# Patient Record
Sex: FEMALE | Race: WHITE | NOT HISPANIC OR LATINO | Employment: FULL TIME | URBAN - METROPOLITAN AREA
[De-identification: names, ages, dates, MRNs, and addresses within clinical notes are randomized per-mention and may not be internally consistent; named-entity substitution may affect disease eponyms.]

---

## 2021-01-11 ENCOUNTER — RECORDS - HEALTHEAST (OUTPATIENT)
Dept: LAB | Facility: CLINIC | Age: 61
End: 2021-01-11

## 2021-01-11 LAB
SARS-COV-2 PCR COMMENT: NORMAL
SARS-COV-2 RNA SPEC QL NAA+PROBE: NEGATIVE
SARS-COV-2 VIRUS SPECIMEN SOURCE: NORMAL

## 2021-02-16 ENCOUNTER — RECORDS - HEALTHEAST (OUTPATIENT)
Dept: LAB | Facility: CLINIC | Age: 61
End: 2021-02-16

## 2021-03-09 ENCOUNTER — RECORDS - HEALTHEAST (OUTPATIENT)
Dept: LAB | Facility: CLINIC | Age: 61
End: 2021-03-09

## 2021-04-13 ENCOUNTER — RECORDS - HEALTHEAST (OUTPATIENT)
Dept: LAB | Facility: CLINIC | Age: 61
End: 2021-04-13

## 2021-04-20 ENCOUNTER — RECORDS - HEALTHEAST (OUTPATIENT)
Dept: LAB | Facility: CLINIC | Age: 61
End: 2021-04-20

## 2021-04-27 ENCOUNTER — RECORDS - HEALTHEAST (OUTPATIENT)
Dept: LAB | Facility: CLINIC | Age: 61
End: 2021-04-27

## 2021-05-18 ENCOUNTER — RECORDS - HEALTHEAST (OUTPATIENT)
Dept: LAB | Facility: CLINIC | Age: 61
End: 2021-05-18

## 2021-05-24 ENCOUNTER — RECORDS - HEALTHEAST (OUTPATIENT)
Dept: LAB | Facility: CLINIC | Age: 61
End: 2021-05-24

## 2021-05-30 ENCOUNTER — RECORDS - HEALTHEAST (OUTPATIENT)
Dept: ADMINISTRATIVE | Facility: CLINIC | Age: 61
End: 2021-05-30

## 2021-05-31 ENCOUNTER — RECORDS - HEALTHEAST (OUTPATIENT)
Dept: ADMINISTRATIVE | Facility: CLINIC | Age: 61
End: 2021-05-31

## 2021-06-01 ENCOUNTER — RECORDS - HEALTHEAST (OUTPATIENT)
Dept: ADMINISTRATIVE | Facility: CLINIC | Age: 61
End: 2021-06-01

## 2021-06-02 ENCOUNTER — RECORDS - HEALTHEAST (OUTPATIENT)
Dept: ADMINISTRATIVE | Facility: CLINIC | Age: 61
End: 2021-06-02

## 2021-06-03 ENCOUNTER — RECORDS - HEALTHEAST (OUTPATIENT)
Dept: LAB | Facility: CLINIC | Age: 61
End: 2021-06-03

## 2021-06-05 ENCOUNTER — RECORDS - HEALTHEAST (OUTPATIENT)
Dept: LAB | Facility: CLINIC | Age: 61
End: 2021-06-05

## 2021-06-10 ENCOUNTER — RECORDS - HEALTHEAST (OUTPATIENT)
Dept: LAB | Facility: CLINIC | Age: 61
End: 2021-06-10

## 2021-06-18 ENCOUNTER — RECORDS - HEALTHEAST (OUTPATIENT)
Dept: LAB | Facility: CLINIC | Age: 61
End: 2021-06-18

## 2021-07-21 ENCOUNTER — RECORDS - HEALTHEAST (OUTPATIENT)
Dept: ADMINISTRATIVE | Facility: CLINIC | Age: 61
End: 2021-07-21

## 2021-08-02 ENCOUNTER — LAB REQUISITION (OUTPATIENT)
Dept: LAB | Facility: CLINIC | Age: 61
End: 2021-08-02
Payer: COMMERCIAL

## 2021-08-02 DIAGNOSIS — U07.1 COVID-19: ICD-10-CM

## 2021-08-03 ENCOUNTER — LAB REQUISITION (OUTPATIENT)
Dept: LAB | Facility: CLINIC | Age: 61
End: 2021-08-03
Payer: COMMERCIAL

## 2021-08-03 DIAGNOSIS — U07.1 COVID-19: ICD-10-CM

## 2021-08-03 PROCEDURE — U0003 INFECTIOUS AGENT DETECTION BY NUCLEIC ACID (DNA OR RNA); SEVERE ACUTE RESPIRATORY SYNDROME CORONAVIRUS 2 (SARS-COV-2) (CORONAVIRUS DISEASE [COVID-19]), AMPLIFIED PROBE TECHNIQUE, MAKING USE OF HIGH THROUGHPUT TECHNOLOGIES AS DESCRIBED BY CMS-2020-01-R: HCPCS | Mod: ORL | Performed by: FAMILY MEDICINE

## 2021-08-04 ENCOUNTER — LAB REQUISITION (OUTPATIENT)
Dept: LAB | Facility: CLINIC | Age: 61
End: 2021-08-04
Payer: COMMERCIAL

## 2021-08-04 DIAGNOSIS — U07.1 COVID-19: ICD-10-CM

## 2021-08-04 LAB — SARS-COV-2 RNA RESP QL NAA+PROBE: NEGATIVE

## 2021-08-05 ENCOUNTER — LAB REQUISITION (OUTPATIENT)
Dept: LAB | Facility: CLINIC | Age: 61
End: 2021-08-05
Payer: COMMERCIAL

## 2021-08-05 DIAGNOSIS — U07.1 COVID-19: ICD-10-CM

## 2021-08-05 PROCEDURE — U0005 INFEC AGEN DETEC AMPLI PROBE: HCPCS | Mod: ORL | Performed by: FAMILY MEDICINE

## 2021-08-06 ENCOUNTER — LAB REQUISITION (OUTPATIENT)
Dept: LAB | Facility: CLINIC | Age: 61
End: 2021-08-06
Payer: COMMERCIAL

## 2021-08-06 DIAGNOSIS — U07.1 COVID-19: ICD-10-CM

## 2021-08-06 LAB — SARS-COV-2 RNA RESP QL NAA+PROBE: NEGATIVE

## 2021-08-10 ENCOUNTER — LAB REQUISITION (OUTPATIENT)
Dept: LAB | Facility: CLINIC | Age: 61
End: 2021-08-10
Payer: COMMERCIAL

## 2021-08-10 DIAGNOSIS — U07.1 COVID-19: ICD-10-CM

## 2021-08-10 PROCEDURE — U0003 INFECTIOUS AGENT DETECTION BY NUCLEIC ACID (DNA OR RNA); SEVERE ACUTE RESPIRATORY SYNDROME CORONAVIRUS 2 (SARS-COV-2) (CORONAVIRUS DISEASE [COVID-19]), AMPLIFIED PROBE TECHNIQUE, MAKING USE OF HIGH THROUGHPUT TECHNOLOGIES AS DESCRIBED BY CMS-2020-01-R: HCPCS | Mod: ORL | Performed by: FAMILY MEDICINE

## 2021-08-11 LAB — SARS-COV-2 RNA RESP QL NAA+PROBE: NEGATIVE

## 2021-08-12 ENCOUNTER — LAB REQUISITION (OUTPATIENT)
Dept: LAB | Facility: CLINIC | Age: 61
End: 2021-08-12
Payer: COMMERCIAL

## 2021-08-12 DIAGNOSIS — U07.1 COVID-19: ICD-10-CM

## 2021-08-12 PROCEDURE — U0003 INFECTIOUS AGENT DETECTION BY NUCLEIC ACID (DNA OR RNA); SEVERE ACUTE RESPIRATORY SYNDROME CORONAVIRUS 2 (SARS-COV-2) (CORONAVIRUS DISEASE [COVID-19]), AMPLIFIED PROBE TECHNIQUE, MAKING USE OF HIGH THROUGHPUT TECHNOLOGIES AS DESCRIBED BY CMS-2020-01-R: HCPCS | Mod: ORL | Performed by: FAMILY MEDICINE

## 2021-08-13 LAB — SARS-COV-2 RNA RESP QL NAA+PROBE: NEGATIVE

## 2021-08-17 ENCOUNTER — LAB REQUISITION (OUTPATIENT)
Dept: LAB | Facility: CLINIC | Age: 61
End: 2021-08-17
Payer: COMMERCIAL

## 2021-08-17 DIAGNOSIS — U07.1 COVID-19: ICD-10-CM

## 2021-08-17 PROCEDURE — U0005 INFEC AGEN DETEC AMPLI PROBE: HCPCS | Mod: ORL | Performed by: FAMILY MEDICINE

## 2021-08-18 LAB — SARS-COV-2 RNA RESP QL NAA+PROBE: NEGATIVE

## 2021-08-19 LAB — SARS-COV-2 RNA RESP QL NAA+PROBE: NEGATIVE

## 2021-08-19 PROCEDURE — U0005 INFEC AGEN DETEC AMPLI PROBE: HCPCS | Mod: ORL | Performed by: FAMILY MEDICINE

## 2021-08-20 ENCOUNTER — LAB REQUISITION (OUTPATIENT)
Dept: LAB | Facility: CLINIC | Age: 61
End: 2021-08-20
Payer: COMMERCIAL

## 2021-08-20 DIAGNOSIS — U07.1 COVID-19: ICD-10-CM

## 2021-08-23 ENCOUNTER — LAB REQUISITION (OUTPATIENT)
Dept: LAB | Facility: CLINIC | Age: 61
End: 2021-08-23
Payer: COMMERCIAL

## 2021-08-23 DIAGNOSIS — U07.1 COVID-19: ICD-10-CM

## 2021-08-24 PROCEDURE — U0003 INFECTIOUS AGENT DETECTION BY NUCLEIC ACID (DNA OR RNA); SEVERE ACUTE RESPIRATORY SYNDROME CORONAVIRUS 2 (SARS-COV-2) (CORONAVIRUS DISEASE [COVID-19]), AMPLIFIED PROBE TECHNIQUE, MAKING USE OF HIGH THROUGHPUT TECHNOLOGIES AS DESCRIBED BY CMS-2020-01-R: HCPCS | Mod: ORL | Performed by: FAMILY MEDICINE

## 2021-08-25 ENCOUNTER — LAB REQUISITION (OUTPATIENT)
Dept: LAB | Facility: CLINIC | Age: 61
End: 2021-08-25
Payer: COMMERCIAL

## 2021-08-25 DIAGNOSIS — U07.1 COVID-19: ICD-10-CM

## 2021-08-25 LAB — SARS-COV-2 RNA RESP QL NAA+PROBE: NEGATIVE

## 2021-08-26 PROCEDURE — U0005 INFEC AGEN DETEC AMPLI PROBE: HCPCS | Mod: ORL | Performed by: FAMILY MEDICINE

## 2021-08-27 ENCOUNTER — LAB REQUISITION (OUTPATIENT)
Dept: LAB | Facility: CLINIC | Age: 61
End: 2021-08-27
Payer: COMMERCIAL

## 2021-08-27 DIAGNOSIS — U07.1 COVID-19: ICD-10-CM

## 2021-08-27 LAB — SARS-COV-2 RNA RESP QL NAA+PROBE: NEGATIVE

## 2021-08-30 PROCEDURE — U0005 INFEC AGEN DETEC AMPLI PROBE: HCPCS | Mod: ORL | Performed by: FAMILY MEDICINE

## 2021-08-31 ENCOUNTER — LAB REQUISITION (OUTPATIENT)
Dept: LAB | Facility: CLINIC | Age: 61
End: 2021-08-31
Payer: COMMERCIAL

## 2021-08-31 DIAGNOSIS — U07.1 COVID-19: ICD-10-CM

## 2021-08-31 LAB — SARS-COV-2 RNA RESP QL NAA+PROBE: NEGATIVE

## 2021-09-01 PROCEDURE — U0003 INFECTIOUS AGENT DETECTION BY NUCLEIC ACID (DNA OR RNA); SEVERE ACUTE RESPIRATORY SYNDROME CORONAVIRUS 2 (SARS-COV-2) (CORONAVIRUS DISEASE [COVID-19]), AMPLIFIED PROBE TECHNIQUE, MAKING USE OF HIGH THROUGHPUT TECHNOLOGIES AS DESCRIBED BY CMS-2020-01-R: HCPCS | Mod: ORL | Performed by: FAMILY MEDICINE

## 2021-09-02 LAB — SARS-COV-2 RNA RESP QL NAA+PROBE: NEGATIVE

## 2021-09-10 ENCOUNTER — LAB REQUISITION (OUTPATIENT)
Dept: LAB | Facility: CLINIC | Age: 61
End: 2021-09-10
Payer: COMMERCIAL

## 2021-09-10 DIAGNOSIS — U07.1 COVID-19: ICD-10-CM

## 2021-09-14 ENCOUNTER — LAB REQUISITION (OUTPATIENT)
Dept: LAB | Facility: CLINIC | Age: 61
End: 2021-09-14
Payer: COMMERCIAL

## 2021-09-14 DIAGNOSIS — U07.1 COVID-19: ICD-10-CM

## 2021-09-16 ENCOUNTER — LAB REQUISITION (OUTPATIENT)
Dept: LAB | Facility: CLINIC | Age: 61
End: 2021-09-16
Payer: COMMERCIAL

## 2021-09-16 DIAGNOSIS — U07.1 COVID-19: ICD-10-CM

## 2021-09-21 ENCOUNTER — LAB REQUISITION (OUTPATIENT)
Dept: LAB | Facility: CLINIC | Age: 61
End: 2021-09-21
Payer: COMMERCIAL

## 2021-09-21 DIAGNOSIS — U07.1 COVID-19: ICD-10-CM

## 2021-11-12 PROCEDURE — U0003 INFECTIOUS AGENT DETECTION BY NUCLEIC ACID (DNA OR RNA); SEVERE ACUTE RESPIRATORY SYNDROME CORONAVIRUS 2 (SARS-COV-2) (CORONAVIRUS DISEASE [COVID-19]), AMPLIFIED PROBE TECHNIQUE, MAKING USE OF HIGH THROUGHPUT TECHNOLOGIES AS DESCRIBED BY CMS-2020-01-R: HCPCS | Mod: ORL | Performed by: FAMILY MEDICINE

## 2021-11-13 ENCOUNTER — LAB REQUISITION (OUTPATIENT)
Dept: LAB | Facility: CLINIC | Age: 61
End: 2021-11-13
Payer: COMMERCIAL

## 2021-11-13 DIAGNOSIS — U07.1 COVID-19: ICD-10-CM

## 2021-11-15 LAB — SARS-COV-2 RNA RESP QL NAA+PROBE: NEGATIVE

## 2021-11-17 PROCEDURE — U0003 INFECTIOUS AGENT DETECTION BY NUCLEIC ACID (DNA OR RNA); SEVERE ACUTE RESPIRATORY SYNDROME CORONAVIRUS 2 (SARS-COV-2) (CORONAVIRUS DISEASE [COVID-19]), AMPLIFIED PROBE TECHNIQUE, MAKING USE OF HIGH THROUGHPUT TECHNOLOGIES AS DESCRIBED BY CMS-2020-01-R: HCPCS | Mod: ORL | Performed by: FAMILY MEDICINE

## 2021-11-18 ENCOUNTER — LAB REQUISITION (OUTPATIENT)
Dept: LAB | Facility: CLINIC | Age: 61
End: 2021-11-18
Payer: COMMERCIAL

## 2021-11-18 DIAGNOSIS — U07.1 COVID-19: ICD-10-CM

## 2021-11-19 LAB — SARS-COV-2 RNA RESP QL NAA+PROBE: NEGATIVE

## 2021-11-22 ENCOUNTER — LAB REQUISITION (OUTPATIENT)
Dept: LAB | Facility: CLINIC | Age: 61
End: 2021-11-22
Payer: COMMERCIAL

## 2021-11-22 DIAGNOSIS — U07.1 COVID-19: ICD-10-CM

## 2021-11-23 ENCOUNTER — LAB REQUISITION (OUTPATIENT)
Dept: LAB | Facility: CLINIC | Age: 61
End: 2021-11-23
Payer: COMMERCIAL

## 2021-11-23 DIAGNOSIS — U07.1 COVID-19: ICD-10-CM

## 2021-11-26 ENCOUNTER — LAB REQUISITION (OUTPATIENT)
Dept: LAB | Facility: CLINIC | Age: 61
End: 2021-11-26
Payer: COMMERCIAL

## 2021-11-26 DIAGNOSIS — U07.1 COVID-19: ICD-10-CM

## 2021-11-30 PROCEDURE — U0003 INFECTIOUS AGENT DETECTION BY NUCLEIC ACID (DNA OR RNA); SEVERE ACUTE RESPIRATORY SYNDROME CORONAVIRUS 2 (SARS-COV-2) (CORONAVIRUS DISEASE [COVID-19]), AMPLIFIED PROBE TECHNIQUE, MAKING USE OF HIGH THROUGHPUT TECHNOLOGIES AS DESCRIBED BY CMS-2020-01-R: HCPCS | Mod: ORL | Performed by: FAMILY MEDICINE

## 2021-11-30 PROCEDURE — U0005 INFEC AGEN DETEC AMPLI PROBE: HCPCS | Mod: ORL | Performed by: FAMILY MEDICINE

## 2021-12-01 ENCOUNTER — LAB REQUISITION (OUTPATIENT)
Dept: LAB | Facility: CLINIC | Age: 61
End: 2021-12-01
Payer: COMMERCIAL

## 2021-12-01 DIAGNOSIS — U07.1 COVID-19: ICD-10-CM

## 2021-12-01 LAB — SARS-COV-2 RNA RESP QL NAA+PROBE: NEGATIVE

## 2021-12-02 ENCOUNTER — LAB REQUISITION (OUTPATIENT)
Dept: LAB | Facility: CLINIC | Age: 61
End: 2021-12-02
Payer: COMMERCIAL

## 2021-12-02 DIAGNOSIS — U07.1 COVID-19: ICD-10-CM

## 2021-12-02 LAB — SARS-COV-2 RNA RESP QL NAA+PROBE: NEGATIVE

## 2021-12-03 PROCEDURE — U0005 INFEC AGEN DETEC AMPLI PROBE: HCPCS | Mod: ORL | Performed by: FAMILY MEDICINE

## 2021-12-04 ENCOUNTER — LAB REQUISITION (OUTPATIENT)
Dept: LAB | Facility: CLINIC | Age: 61
End: 2021-12-04
Payer: COMMERCIAL

## 2021-12-04 DIAGNOSIS — U07.1 COVID-19: ICD-10-CM

## 2021-12-04 LAB — SARS-COV-2 RNA RESP QL NAA+PROBE: NEGATIVE

## 2021-12-06 ENCOUNTER — LAB REQUISITION (OUTPATIENT)
Dept: LAB | Facility: CLINIC | Age: 61
End: 2021-12-06
Payer: COMMERCIAL

## 2021-12-06 DIAGNOSIS — U07.1 COVID-19: ICD-10-CM

## 2021-12-07 PROCEDURE — U0003 INFECTIOUS AGENT DETECTION BY NUCLEIC ACID (DNA OR RNA); SEVERE ACUTE RESPIRATORY SYNDROME CORONAVIRUS 2 (SARS-COV-2) (CORONAVIRUS DISEASE [COVID-19]), AMPLIFIED PROBE TECHNIQUE, MAKING USE OF HIGH THROUGHPUT TECHNOLOGIES AS DESCRIBED BY CMS-2020-01-R: HCPCS | Mod: ORL | Performed by: FAMILY MEDICINE

## 2021-12-08 ENCOUNTER — LAB REQUISITION (OUTPATIENT)
Dept: LAB | Facility: CLINIC | Age: 61
End: 2021-12-08
Payer: COMMERCIAL

## 2021-12-08 DIAGNOSIS — U07.1 COVID-19: ICD-10-CM

## 2021-12-08 LAB — SARS-COV-2 RNA RESP QL NAA+PROBE: NEGATIVE

## 2021-12-10 PROCEDURE — U0005 INFEC AGEN DETEC AMPLI PROBE: HCPCS | Mod: ORL | Performed by: FAMILY MEDICINE

## 2021-12-11 LAB — SARS-COV-2 RNA RESP QL NAA+PROBE: NEGATIVE

## 2021-12-13 ENCOUNTER — LAB REQUISITION (OUTPATIENT)
Dept: LAB | Facility: CLINIC | Age: 61
End: 2021-12-13
Payer: COMMERCIAL

## 2021-12-13 DIAGNOSIS — U07.1 COVID-19: ICD-10-CM

## 2021-12-15 ENCOUNTER — LAB REQUISITION (OUTPATIENT)
Dept: LAB | Facility: CLINIC | Age: 61
End: 2021-12-15
Payer: COMMERCIAL

## 2021-12-15 DIAGNOSIS — U07.1 COVID-19: ICD-10-CM

## 2021-12-17 PROCEDURE — U0003 INFECTIOUS AGENT DETECTION BY NUCLEIC ACID (DNA OR RNA); SEVERE ACUTE RESPIRATORY SYNDROME CORONAVIRUS 2 (SARS-COV-2) (CORONAVIRUS DISEASE [COVID-19]), AMPLIFIED PROBE TECHNIQUE, MAKING USE OF HIGH THROUGHPUT TECHNOLOGIES AS DESCRIBED BY CMS-2020-01-R: HCPCS | Mod: ORL | Performed by: FAMILY MEDICINE

## 2021-12-18 LAB — SARS-COV-2 RNA RESP QL NAA+PROBE: NEGATIVE

## 2022-05-26 ENCOUNTER — LAB REQUISITION (OUTPATIENT)
Dept: LAB | Facility: CLINIC | Age: 62
End: 2022-05-26
Payer: COMMERCIAL

## 2022-05-26 DIAGNOSIS — U07.1 COVID-19: ICD-10-CM

## 2022-06-01 ENCOUNTER — LAB REQUISITION (OUTPATIENT)
Dept: LAB | Facility: CLINIC | Age: 62
End: 2022-06-01
Payer: COMMERCIAL

## 2022-06-01 DIAGNOSIS — U07.1 COVID-19: ICD-10-CM

## 2023-03-10 ENCOUNTER — APPOINTMENT (OUTPATIENT)
Dept: MRI IMAGING | Facility: CLINIC | Age: 63
End: 2023-03-10
Attending: EMERGENCY MEDICINE
Payer: COMMERCIAL

## 2023-03-10 ENCOUNTER — APPOINTMENT (OUTPATIENT)
Dept: CT IMAGING | Facility: CLINIC | Age: 63
End: 2023-03-10
Attending: EMERGENCY MEDICINE
Payer: COMMERCIAL

## 2023-03-10 ENCOUNTER — HOSPITAL ENCOUNTER (EMERGENCY)
Facility: CLINIC | Age: 63
Discharge: HOME | End: 2023-03-10
Attending: EMERGENCY MEDICINE | Admitting: EMERGENCY MEDICINE
Payer: COMMERCIAL

## 2023-03-10 VITALS
OXYGEN SATURATION: 95 % | TEMPERATURE: 98 F | SYSTOLIC BLOOD PRESSURE: 148 MMHG | HEIGHT: 65 IN | WEIGHT: 187 LBS | DIASTOLIC BLOOD PRESSURE: 84 MMHG | BODY MASS INDEX: 31.16 KG/M2 | RESPIRATION RATE: 16 BRPM | HEART RATE: 61 BPM

## 2023-03-10 DIAGNOSIS — M54.41 ACUTE BILATERAL LOW BACK PAIN WITH BILATERAL SCIATICA: ICD-10-CM

## 2023-03-10 DIAGNOSIS — M54.42 ACUTE BILATERAL LOW BACK PAIN WITH BILATERAL SCIATICA: ICD-10-CM

## 2023-03-10 LAB
ANION GAP SERPL CALCULATED.3IONS-SCNC: 7 MMOL/L (ref 5–18)
BASOPHILS # BLD AUTO: 0 10E3/UL (ref 0–0.2)
BASOPHILS NFR BLD AUTO: 0 %
BUN SERPL-MCNC: 19 MG/DL (ref 8–22)
C REACTIVE PROTEIN LHE: 0.7 MG/DL (ref 0–?)
CALCIUM SERPL-MCNC: 8.4 MG/DL (ref 8.5–10.5)
CHLORIDE BLD-SCNC: 110 MMOL/L (ref 98–107)
CO2 SERPL-SCNC: 23 MMOL/L (ref 22–31)
CREAT SERPL-MCNC: 0.76 MG/DL (ref 0.6–1.1)
EOSINOPHIL # BLD AUTO: 0.2 10E3/UL (ref 0–0.7)
EOSINOPHIL NFR BLD AUTO: 2 %
ERYTHROCYTE [DISTWIDTH] IN BLOOD BY AUTOMATED COUNT: 13.4 % (ref 10–15)
ERYTHROCYTE [SEDIMENTATION RATE] IN BLOOD BY WESTERGREN METHOD: 7 MM/HR (ref 0–20)
GFR SERPL CREATININE-BSD FRML MDRD: 88 ML/MIN/1.73M2
GLUCOSE BLD-MCNC: 103 MG/DL (ref 70–125)
HCT VFR BLD AUTO: 43.4 % (ref 35–47)
HGB BLD-MCNC: 14 G/DL (ref 11.7–15.7)
IMM GRANULOCYTES # BLD: 0 10E3/UL
IMM GRANULOCYTES NFR BLD: 0 %
LYMPHOCYTES # BLD AUTO: 1.7 10E3/UL (ref 0.8–5.3)
LYMPHOCYTES NFR BLD AUTO: 25 %
MCH RBC QN AUTO: 29.4 PG (ref 26.5–33)
MCHC RBC AUTO-ENTMCNC: 32.3 G/DL (ref 31.5–36.5)
MCV RBC AUTO: 91 FL (ref 78–100)
MONOCYTES # BLD AUTO: 0.5 10E3/UL (ref 0–1.3)
MONOCYTES NFR BLD AUTO: 8 %
NEUTROPHILS # BLD AUTO: 4.4 10E3/UL (ref 1.6–8.3)
NEUTROPHILS NFR BLD AUTO: 65 %
NRBC # BLD AUTO: 0 10E3/UL
NRBC BLD AUTO-RTO: 0 /100
PLATELET # BLD AUTO: 157 10E3/UL (ref 150–450)
POTASSIUM BLD-SCNC: 4.3 MMOL/L (ref 3.5–5)
RBC # BLD AUTO: 4.77 10E6/UL (ref 3.8–5.2)
SODIUM SERPL-SCNC: 140 MMOL/L (ref 136–145)
WBC # BLD AUTO: 6.8 10E3/UL (ref 4–11)

## 2023-03-10 PROCEDURE — 250N000011 HC RX IP 250 OP 636: Performed by: EMERGENCY MEDICINE

## 2023-03-10 PROCEDURE — 80048 BASIC METABOLIC PNL TOTAL CA: CPT | Performed by: EMERGENCY MEDICINE

## 2023-03-10 PROCEDURE — 36415 COLL VENOUS BLD VENIPUNCTURE: CPT | Performed by: EMERGENCY MEDICINE

## 2023-03-10 PROCEDURE — 99285 EMERGENCY DEPT VISIT HI MDM: CPT | Mod: 25

## 2023-03-10 PROCEDURE — 72131 CT LUMBAR SPINE W/O DYE: CPT

## 2023-03-10 PROCEDURE — 72158 MRI LUMBAR SPINE W/O & W/DYE: CPT

## 2023-03-10 PROCEDURE — A9585 GADOBUTROL INJECTION: HCPCS | Performed by: EMERGENCY MEDICINE

## 2023-03-10 PROCEDURE — 85018 HEMOGLOBIN: CPT | Performed by: EMERGENCY MEDICINE

## 2023-03-10 PROCEDURE — 74176 CT ABD & PELVIS W/O CONTRAST: CPT

## 2023-03-10 PROCEDURE — 255N000002 HC RX 255 OP 636: Performed by: EMERGENCY MEDICINE

## 2023-03-10 PROCEDURE — 96375 TX/PRO/DX INJ NEW DRUG ADDON: CPT | Mod: 59

## 2023-03-10 PROCEDURE — 86140 C-REACTIVE PROTEIN: CPT | Performed by: EMERGENCY MEDICINE

## 2023-03-10 PROCEDURE — 85652 RBC SED RATE AUTOMATED: CPT | Performed by: EMERGENCY MEDICINE

## 2023-03-10 PROCEDURE — 96374 THER/PROPH/DIAG INJ IV PUSH: CPT | Mod: 59

## 2023-03-10 PROCEDURE — 250N000013 HC RX MED GY IP 250 OP 250 PS 637: Performed by: EMERGENCY MEDICINE

## 2023-03-10 RX ORDER — MORPHINE SULFATE 4 MG/ML
4 INJECTION, SOLUTION INTRAMUSCULAR; INTRAVENOUS ONCE
Status: COMPLETED | OUTPATIENT
Start: 2023-03-10 | End: 2023-03-10

## 2023-03-10 RX ORDER — ACETAMINOPHEN 325 MG/1
975 TABLET ORAL ONCE
Status: COMPLETED | OUTPATIENT
Start: 2023-03-10 | End: 2023-03-10

## 2023-03-10 RX ORDER — ONDANSETRON 2 MG/ML
4 INJECTION INTRAMUSCULAR; INTRAVENOUS ONCE
Status: COMPLETED | OUTPATIENT
Start: 2023-03-10 | End: 2023-03-10

## 2023-03-10 RX ORDER — CYCLOBENZAPRINE HCL 10 MG
10 TABLET ORAL 3 TIMES DAILY PRN
Qty: 15 TABLET | Refills: 0 | Status: SHIPPED | OUTPATIENT
Start: 2023-03-10 | End: 2023-03-15

## 2023-03-10 RX ORDER — KETOROLAC TROMETHAMINE 15 MG/ML
15 INJECTION, SOLUTION INTRAMUSCULAR; INTRAVENOUS ONCE
Status: COMPLETED | OUTPATIENT
Start: 2023-03-10 | End: 2023-03-10

## 2023-03-10 RX ORDER — DIMENHYDRINATE 50 MG
50 TABLET ORAL ONCE
Status: COMPLETED | OUTPATIENT
Start: 2023-03-10 | End: 2023-03-10

## 2023-03-10 RX ORDER — PREDNISONE 20 MG/1
TABLET ORAL
Qty: 10 TABLET | Refills: 0 | Status: SHIPPED | OUTPATIENT
Start: 2023-03-10

## 2023-03-10 RX ORDER — NAPROXEN 250 MG/1
250 TABLET ORAL 2 TIMES DAILY WITH MEALS
Qty: 14 TABLET | Refills: 0 | Status: SHIPPED | OUTPATIENT
Start: 2023-03-10 | End: 2023-03-17

## 2023-03-10 RX ORDER — OXYCODONE HYDROCHLORIDE 5 MG/1
5 TABLET ORAL EVERY 6 HOURS PRN
Qty: 6 TABLET | Refills: 0 | Status: SHIPPED | OUTPATIENT
Start: 2023-03-10 | End: 2023-03-13

## 2023-03-10 RX ORDER — GADOBUTROL 604.72 MG/ML
8.5 INJECTION INTRAVENOUS ONCE
Status: COMPLETED | OUTPATIENT
Start: 2023-03-10 | End: 2023-03-10

## 2023-03-10 RX ADMIN — MORPHINE SULFATE 4 MG: 4 INJECTION, SOLUTION INTRAMUSCULAR; INTRAVENOUS at 17:23

## 2023-03-10 RX ADMIN — KETOROLAC TROMETHAMINE 15 MG: 15 INJECTION, SOLUTION INTRAMUSCULAR; INTRAVENOUS at 16:24

## 2023-03-10 RX ADMIN — ACETAMINOPHEN 975 MG: 325 TABLET ORAL at 16:24

## 2023-03-10 RX ADMIN — GADOBUTROL 8.5 ML: 604.72 INJECTION INTRAVENOUS at 20:03

## 2023-03-10 RX ADMIN — DIMENHYDRINATE 50 MG: 50 TABLET ORAL at 16:27

## 2023-03-10 RX ADMIN — ONDANSETRON 4 MG: 2 INJECTION INTRAMUSCULAR; INTRAVENOUS at 17:23

## 2023-03-10 ASSESSMENT — ACTIVITIES OF DAILY LIVING (ADL)
ADLS_ACUITY_SCORE: 37
ADLS_ACUITY_SCORE: 35

## 2023-03-10 ASSESSMENT — ENCOUNTER SYMPTOMS
BACK PAIN: 1
CHILLS: 0
FEVER: 0

## 2023-03-10 NOTE — Clinical Note
Yane De Leon was seen and treated in our emergency department on 3/10/2023.  She may return to work on 03/12/2023.       If you have any questions or concerns, please don't hesitate to call.      Molly Marquez MD

## 2023-03-10 NOTE — ED TRIAGE NOTES
Pt presents with sudden onset lower back pain with numbness and tingling in bilateral lower extremities beginning 20 minutes ago. Denies loss of bladder or bowel control. Denies recent injury

## 2023-03-10 NOTE — Clinical Note
Yane De Leon was seen and treated in our emergency department on 3/10/2023.  She may return to work on 03/13/2023.       If you have any questions or concerns, please don't hesitate to call.      Molly Marquez MD

## 2023-03-10 NOTE — ED PROVIDER NOTES
EMERGENCY DEPARTMENT ENCOUNTER      NAME: Yane De Leon  AGE: 62 year old female  YOB: 1960  MRN: [id number]  EVALUATION DATE & TIME: No admission date for patient encounter.    PCP: No primary care provider on file.    ED PROVIDER: Molly Marquez MD      Chief Complaint   Patient presents with     Back Pain         FINAL IMPRESSION:  1. Acute bilateral low back pain with bilateral sciatica          ED COURSE & MEDICAL DECISION MAKIN:06 PM Met with patient for initial interview and exam. Discussed initial plan for care for their stay in the emergency department.  5:14 PM I updated the patient.   8:42 PM I rechecked on the patient. The patient was in the bathroom but the nurse thought that the patient felt better. I will recheck on her in a few minutes.   8:48 PM I rechecked on the patient.  I discussed with her MRI findings.  She is comfortable with discharge home with supportive management and follow-up with her spine specialist.    Pertinent Labs & Imaging studies reviewed. (See chart for details)  62 year old female presents to the Emergency Department for evaluation of sudden onset of lower back pain that she reports is bilateral but greater on the right, radiating to both of her legs, causing numbness in both of her legs.  She states that she was just sitting there on the couch when this suddenly occurred.  She denies any trauma or change in activity.  Initial CT abdomen pelvis was obtained to evaluate for other ideology of back pain such as ureteral stone and was unremarkable.  CT scan of the lumbar spine with no high-grade spinal canal or neural foraminal stenosis, posterior stabilization and interbody fusion L5-S1 with no hardware complication.  Given the patient's ongoing symptoms we will plan for MRI of the lumbar spine to evaluate for any acute cord pathology given the bilateral sciatic nerve pain with acute onset of low back pain.    My over the lumbar spine with minimal  bulging disks in the lumbar spine, otherwise no other acute findings.    At the conclusion of the encounter I discussed the results of all of the tests and the disposition. The questions were answered. The patient or family acknowledged understanding and was agreeable with the care plan.       Medical Decision Making    History:    Supplemental history from: Documented in chart, if applicable and N/A    External Record(s) reviewed: Documented in chart, if applicable. and Outpatient Record: I reviewed the patients outpatient records.     Work Up:    Chart documentation includes differential considered and any EKGs or imaging independently interpreted by provider, where specified.    In additional to work up documented, I considered the following work up: Documented in chart, if applicable.    External consultation:    Discussion of management with another provider: Documented in chart, if applicable    Complicating factors:    Care impacted by chronic illness: N/A    Care affected by social determinants of health: N/A    Disposition considerations: Discharge. I prescribed additional prescription strength medication(s) as charted. I considered admission, but discharged patient after significant clinical improvement.      MEDICATIONS GIVEN IN THE EMERGENCY:  Medications   ketorolac (TORADOL) injection 15 mg (15 mg Intravenous $Given 3/10/23 1624)   acetaminophen (TYLENOL) tablet 975 mg (975 mg Oral $Given 3/10/23 1624)   dimenhyDRINATE (DRAMAMINE) tablet 50 mg (50 mg Oral $Given 3/10/23 1627)   morphine (PF) injection 4 mg (4 mg Intravenous $Given 3/10/23 1723)   ondansetron (ZOFRAN) injection 4 mg (4 mg Intravenous $Given 3/10/23 1723)   gadobutrol (GADAVIST) injection 8.5 mL (8.5 mLs Intravenous $Given 3/10/23 2003)       NEW PRESCRIPTIONS STARTED AT TODAY'S ER VISIT  New Prescriptions    No medications on file          =================================================================    HPI    Patient information  "was obtained from: Patient    Use of : N/A         Yane De Leon is a 62 year old female with no pertinent history who presents to this ED for evaluation of back pain    Approximately 1 hour ago while sitting on the couch the patient reports a sudden onset of right lower back pain that radiates to her buttocks and lower legs. The pain is constant and unchanged by movement. She notes that she tried a heating pad earlier but did not have any relief with this. The patient denies any prior traumas or heavy lifting prior to the fall.     The patient reports a remote history of a surgery for a bulging disc which she believes was L5-S1. She denies any loss of bowel control, blood in urine, fever, chills, sweats, or any other complaints.     REVIEW OF SYSTEMS   Review of Systems   Constitutional: Negative for chills and fever.   Musculoskeletal: Positive for back pain.   All other systems reviewed and are negative.       PAST MEDICAL HISTORY:  No past medical history on file.    PAST SURGICAL HISTORY:  No past surgical history on file.        CURRENT MEDICATIONS:    No current outpatient medications on file.      ALLERGIES:  No Known Allergies    FAMILY HISTORY:  No family history on file.    SOCIAL HISTORY:        VITALS:  /87   Pulse 56   Temp 98  F (36.7  C) (Temporal)   Resp 16   Ht 1.651 m (5' 5\")   Wt 84.8 kg (187 lb)   SpO2 92%   BMI 31.12 kg/m      PHYSICAL EXAM    Constitutional: Well developed, Well nourished, NAD  HENT: Normocephalic, Atraumatic, Bilateral external ears normal, Oropharynx normal, mucous membranes moist, Nose normal.   Neck- Normal range of motion, No tenderness, Supple, No stridor.  Eyes: PERRL, EOMI, Conjunctiva normal, No discharge.   Respiratory: Normal breath sounds, No respiratory distress  Cardiovascular: Normal heart rate, Regular rhythm  GI: Bowel sounds normal, Soft, No tenderness,   Musculoskeletal: No edema. Good range of motion in all major joints. No " tenderness to palpation or major deformities noted. No midline spinal tenderness. Tenderness over the right SI joint. Ambulatory.   Integument: Warm, Dry, No erythema, No rash  Neurologic: Alert & oriented x 3, Normal motor function, Normal sensory function, No focal deficits noted. Normal gait.   Psychiatric: Affect normal, Judgment normal, Mood normal.      LAB:  All pertinent labs reviewed and interpreted.  Results for orders placed or performed during the hospital encounter of 03/10/23   CT Abdomen Pelvis w/o Contrast    Impression    IMPRESSION:     1.  No acute abnormality or specific cause of flank pain are identified.     2.  Two nonobstructing right renal calculi measuring up to 2 mm. No hydronephrosis.    3.  Detailed findings within the lumbar spine are dictated separately.   Lumbar spine CT w/o contrast    Impression    IMPRESSION:  1.  No fracture or posttraumatic subluxation.  2.  No high-grade spinal canal or neural foraminal stenosis.  3.  Posterior stabilization and interbody fusion L5-S1, no hardware complication.  4.  Partially imaged thoracolumbar dextrocurvature.   MR Lumbar Spine w/o & w Contrast    Impression    IMPRESSION:  1.  L5-S1 posterior fusion with ridging marrow across the interbody spacer. The canal and foramina are patent at this level.  2.  At the remaining levels, mild degenerative changes without significant canal or foraminal stenosis.   Basic metabolic panel   Result Value Ref Range    Sodium 140 136 - 145 mmol/L    Potassium 4.3 3.5 - 5.0 mmol/L    Chloride 110 (H) 98 - 107 mmol/L    Carbon Dioxide (CO2) 23 22 - 31 mmol/L    Anion Gap 7 5 - 18 mmol/L    Urea Nitrogen 19 8 - 22 mg/dL    Creatinine 0.76 0.60 - 1.10 mg/dL    Calcium 8.4 (L) 8.5 - 10.5 mg/dL    Glucose 103 70 - 125 mg/dL    GFR Estimate 88 >60 mL/min/1.73m2   CRP inflammation   Result Value Ref Range    CRP 0.7 0.0 - <0.8 mg/dL   Erythrocyte sedimentation rate auto   Result Value Ref Range    Erythrocyte  Sedimentation Rate 7 0 - 20 mm/hr   CBC with platelets and differential   Result Value Ref Range    WBC Count 6.8 4.0 - 11.0 10e3/uL    RBC Count 4.77 3.80 - 5.20 10e6/uL    Hemoglobin 14.0 11.7 - 15.7 g/dL    Hematocrit 43.4 35.0 - 47.0 %    MCV 91 78 - 100 fL    MCH 29.4 26.5 - 33.0 pg    MCHC 32.3 31.5 - 36.5 g/dL    RDW 13.4 10.0 - 15.0 %    Platelet Count 157 150 - 450 10e3/uL    % Neutrophils 65 %    % Lymphocytes 25 %    % Monocytes 8 %    % Eosinophils 2 %    % Basophils 0 %    % Immature Granulocytes 0 %    NRBCs per 100 WBC 0 <1 /100    Absolute Neutrophils 4.4 1.6 - 8.3 10e3/uL    Absolute Lymphocytes 1.7 0.8 - 5.3 10e3/uL    Absolute Monocytes 0.5 0.0 - 1.3 10e3/uL    Absolute Eosinophils 0.2 0.0 - 0.7 10e3/uL    Absolute Basophils 0.0 0.0 - 0.2 10e3/uL    Absolute Immature Granulocytes 0.0 <=0.4 10e3/uL    Absolute NRBCs 0.0 10e3/uL       RADIOLOGY:  Reviewed all pertinent imaging. Please see official radiology report.  MR Lumbar Spine w/o & w Contrast   Final Result   IMPRESSION:   1.  L5-S1 posterior fusion with ridging marrow across the interbody spacer. The canal and foramina are patent at this level.   2.  At the remaining levels, mild degenerative changes without significant canal or foraminal stenosis.      Lumbar spine CT w/o contrast   Final Result   IMPRESSION:   1.  No fracture or posttraumatic subluxation.   2.  No high-grade spinal canal or neural foraminal stenosis.   3.  Posterior stabilization and interbody fusion L5-S1, no hardware complication.   4.  Partially imaged thoracolumbar dextrocurvature.      CT Abdomen Pelvis w/o Contrast   Final Result   IMPRESSION:       1.  No acute abnormality or specific cause of flank pain are identified.       2.  Two nonobstructing right renal calculi measuring up to 2 mm. No hydronephrosis.      3.  Detailed findings within the lumbar spine are dictated separately.            Emmie BUSCH, am serving as a scribe to document services  personally performed by Molly Marquez, based on my observation and the provider's statements to me. I, Molly Marquez MD, attest that Emmie West is acting in a scribe capacity, has observed my performance of the services and has documented them in accordance with my direction.    Molly Marquez MD  Emergency Medicine  [facility] EMERGENCY ROOM  [address] [address]  [phone]     Molly Marquez MD  03/10/23 5282

## 2024-04-02 ENCOUNTER — OFFICE VISIT (OUTPATIENT)
Dept: PODIATRY | Facility: CLINIC | Age: 64
End: 2024-04-02
Payer: COMMERCIAL

## 2024-04-02 VITALS — SYSTOLIC BLOOD PRESSURE: 137 MMHG | DIASTOLIC BLOOD PRESSURE: 75 MMHG | OXYGEN SATURATION: 96 % | HEART RATE: 71 BPM

## 2024-04-02 DIAGNOSIS — M72.2 PLANTAR FASCIITIS, BILATERAL: ICD-10-CM

## 2024-04-02 DIAGNOSIS — M21.6X2 PRONATION DEFORMITY OF BOTH FEET: Primary | ICD-10-CM

## 2024-04-02 DIAGNOSIS — M21.6X1 PRONATION DEFORMITY OF BOTH FEET: Primary | ICD-10-CM

## 2024-04-02 PROCEDURE — 99203 OFFICE O/P NEW LOW 30 MIN: CPT | Mod: 25 | Performed by: PODIATRIST

## 2024-04-02 PROCEDURE — 20550 NJX 1 TENDON SHEATH/LIGAMENT: CPT | Mod: 50 | Performed by: PODIATRIST

## 2024-04-02 RX ORDER — LEVOTHYROXINE SODIUM 200 UG/1
1 TABLET ORAL DAILY
COMMUNITY
Start: 2023-10-04

## 2024-04-02 RX ORDER — FERROUS SULFATE 325(65) MG
65 TABLET ORAL
COMMUNITY

## 2024-04-02 RX ORDER — ESCITALOPRAM OXALATE 20 MG/1
20 TABLET ORAL DAILY
COMMUNITY
Start: 2023-07-14

## 2024-04-02 RX ORDER — TRIAMCINOLONE ACETONIDE 40 MG/ML
40 INJECTION, SUSPENSION INTRA-ARTICULAR; INTRAMUSCULAR ONCE
Status: COMPLETED | OUTPATIENT
Start: 2024-04-02 | End: 2024-04-02

## 2024-04-02 RX ORDER — LIDOCAINE HYDROCHLORIDE 20 MG/ML
1 INJECTION, SOLUTION INFILTRATION; PERINEURAL ONCE
Status: COMPLETED | OUTPATIENT
Start: 2024-04-02 | End: 2024-04-02

## 2024-04-02 RX ADMIN — TRIAMCINOLONE ACETONIDE 40 MG: 40 INJECTION, SUSPENSION INTRA-ARTICULAR; INTRAMUSCULAR at 14:50

## 2024-04-02 RX ADMIN — LIDOCAINE HYDROCHLORIDE 1 ML: 20 INJECTION, SOLUTION INFILTRATION; PERINEURAL at 14:49

## 2024-04-02 RX ADMIN — LIDOCAINE HYDROCHLORIDE 1 ML: 20 INJECTION, SOLUTION INFILTRATION; PERINEURAL at 14:50

## 2024-04-02 ASSESSMENT — PAIN SCALES - GENERAL: PAINLEVEL: MODERATE PAIN (5)

## 2024-04-02 NOTE — Clinical Note
"    4/2/2024         RE: Yane De Leon  [address]  [facility] [place] [facility] [address]        Dear Colleague,    Thank you for referring your patient, Yane De Leon, to the [facility]. Please see a copy of my visit note below.    FOOT AND ANKLE SURGERY/PODIATRY CONSULT NOTE         ASSESSMENT:   ***      TREATMENT:  ***        HPI: Yane De Leon presented to the clinic today  ***.      {PAST MEDICAL HISTORY:[id number]::\"***\"}    {SOCIAL HISTORY:[id number]::\"***\"}     No Known Allergies       Current Outpatient Medications:     predniSONE (DELTASONE) 20 MG tablet, Take two tablets (= 40mg) each day for 5 (five) days, Disp: 10 tablet, Rfl: 0     No family history on file.     Social History     Socioeconomic History    Marital status: Single     Spouse name: Not on file    Number of children: Not on file    Years of education: Not on file    Highest education level: Not on file   Occupational History    Not on file   Tobacco Use    Smoking status: Not on file    Smokeless tobacco: Not on file   Substance and Sexual Activity    Alcohol use: Not on file    Drug use: Not on file    Sexual activity: Not on file   Other Topics Concern    Not on file   Social History Narrative    Not on file     Social Determinants of Health     Financial Resource Strain: Not on file   Food Insecurity: Not on file   Transportation Needs: Not on file   Physical Activity: Not on file   Stress: Not on file   Social Connections: Not on file   Interpersonal Safety: Not on file   Housing Stability: Not on file        Review of Systems - Patient denies fever, chills, rash, wound, stiffness, limping, numbness, weakness, heart burn, blood in stool, chest pain with activity, calf pain when walking, shortness of breath with activity, chronic cough, easy bleeding/bruising, swelling of ankles, excessive thirst, fatigue, depression, anxiety.  Patient admits to ***.      OBJECTIVE:  Appearance: alert, well appearing, and in no " distress.    There were no vitals taken for this visit.     There is no height or weight on file to calculate BMI.     General appearance: Patient is alert and fully cooperative with history & exam.  No sign of distress is noted during the visit.  Psychiatric: Affect is pleasant & appropriate.  Patient appears motivated to improve health.  Respiratory: Breathing is regular & unlabored while sitting.  HEENT: Hearing is intact to spoken word.  Speech is clear.  No gross evidence of visual impairment that would impact ambulation.    Vascular: Dorsalis pedis and posterior tibial pulses are palpable. There is pedal hair growth ***.  CFT < 3 sec from anterior tibial surface to distal digits ***. There is no appreciable edema noted.  Dermatologic: Turgor and texture are within normal limits. No coloration or temperature changes. No primary or secondary lesions noted.  Neurologic: All epicritic and proprioceptive sensations are grossly intact ***.  Musculoskeletal: All active and passive ankle, subtalar, midtarsal, and 1st MPJ range of motion are grossly intact without pain or crepitus, with the exception of ***. Manual muscle strength is ***. All dorsiflexors, plantarflexors, invertors, evertors are intact ***. Tenderness present to *** on palpation. Tenderness to *** with range of motion. Calf is soft/non-tender without warmth/induration    Imaging:       No images are attached to the encounter or orders placed in the encounter.     No results found.   No results found.           Vasquez Monteiro DPM  [facility] Foot & Ankle Surgery/Podiatry         Again, thank you for allowing me to participate in the care of your patient.        Sincerely,        Vasquez Funes DPM

## 2024-04-02 NOTE — PATIENT INSTRUCTIONS
What are Prescription Custom Orthotics?  Custom orthotics are specially-made devices designed to support and comfort your feet. Prescription orthotics are crafted for you and no one else. They match the contours of your feet precisely and are designed for the way you move. Orthotics are only manufactured after a podiatrist has conducted a complete evaluation of your feet, ankles, and legs, so the orthotic can accommodate your unique foot structure and pathology.  Prescription orthotics are divided into two categories:  Functional orthotics are designed to control abnormal motion. They may be used to treat foot pain caused by abnormal motion; they can also be used to treat injuries such as shin splints or tendinitis. Functional orthotics are usually crafted of a semi-rigid material such as plastic or graphite.  Accommodative orthotics are softer and meant to provide additional cushioning and support. They can be used to treat diabetic foot ulcers, painful calluses on the bottom of the foot, and other uncomfortable conditions.  Podiatrists use orthotics to treat foot problems such as plantar fasciitis, bursitis, tendinitis, diabetic foot ulcers, and foot, ankle, and heel pain. Clinical research studies have shown that podiatrist-prescribed foot orthotics decrease foot pain and improve function.  Orthotics typically cost more than shoe inserts purchased in a retail store, but the additional cost is usually well worth it. Unlike shoe inserts, orthotics are molded to fit each individual foot, so you can be sure that your orthotics fit and do what they're supposed to do. Prescription orthotics are also made of top-notch materials and last many years when cared for properly. Insurance often helps pay for prescription orthotics.  What are Shoe Inserts?   You've seen them at the grocery store and at the mall. You've probably even seen them on TV and online. Shoe inserts are any kind of non-prescription foot support designed  to be worn inside a shoe. Pre-packaged, mass produced, arch supports are shoe inserts. So are the  custom-made  insoles and foot supports that you can order online or at retail stores. Unless the device has been prescribed by a doctor and crafted for your specific foot, it's a shoe insert, not a custom orthotic device--despite what the ads might say.  Shoe inserts can be very helpful for a variety of foot ailments, including flat arches and foot and leg pain. They can cushion your feet, provide comfort, and support your arches, but they can't correct biomechanical foot problems or cure long-standing foot issues.  The most common types of shoe inserts are:  Arch supports: Some people have high arches. Others have low arches or flat feet. Arch supports generally have a  bumped-up  appearance and are designed to support the foot's natural arch.   Insoles: Insoles slip into your shoe to provide extra cushioning and support. Insoles are often made of gel, foam, or plastic.   Heel liners: Heel liners, sometimes called heel pads or heel cups, provide extra cushioning in the heel region. They may be especially useful for patients who have foot pain caused by age-related thinning of the heels' natural fat pads.   Foot cushions: Do your shoes rub against your heel or your toes? Foot cushions come in many different shapes and sizes and can be used as a barrier between you and your shoe.  Choosing an Over-the-Counter Shoe Insert  Selecting a shoe insert from the wide variety of devices on the market can be overwhelming. Here are some podiatrist-tested tips to help you find the insert that best meets your needs:  Consider your health. Do you have diabetes? Problems with circulation? An over-the-counter insert may not be your best bet. Diabetes and poor circulation increase your risk of foot ulcers and infections, so schedule an appointment with a podiatrist. He or she can help you select a solution that won't cause additional  health problems.   Think about the purpose. Are you planning to run a marathon, or do you just need a little arch support in your work shoes? Look for a product that fits your planned level of activity.   Bring your shoes. For the insert to be effective, it has to fit into your shoes. So bring your sneakers, dress shoes, or work boots--whatever you plan to wear with your insert. Look for an insert that will fit the contours of your shoe.   Try them on. If all possible, slip the insert into your shoe and try it out. Walk around a little. How does it feel? Don't assume that feelings of pressure will go away with continued wear. (If you can't try the inserts at the store, ask about the store's return policy and hold on to your receipt.)    Please call one of the [place] locations below to schedule an appointment. If you received a prescription please bring it with you to your appointment. Some locations are limited to what they carry.    Office Locations    [facility]  [address] [place] [address]  Home Medical Equipment, [address]   Phone: [phone]   Orthotics and Prosthetics, [address]   Phone: [phone]    [facility]   Home Medical Equipment   [address] [address]  Phone :[phone]  Orthotics and Prosthetics   [address], [place] [facility] [address]  Phone:[phone]          [facility]  [address]  [address]   [place] [address]   Phone: [phone]    [facility] Professional Bldg.  [address]  [place] [address]  Phone: [phone]    [facility]  [address]  [place] [address]  Phone: [phone]  Fax: [phone]    [facility] Medical [facility]   [address]  [place] [address]  Phone:  [phone]    [facility]  [address]  [place] [address]  Phone: [phone]    [facility]  [address]  [place] [address]  Phone: [phone]    [place]   [address] [place] [address]   Phone: [phone]    WEARING YOUR CUSTOM FOOT ORTHOTICS   Most insurance plans cover one pair of orthotics per year. You must check with your   insurance plan to see what your payment responsibility will be. Please call your   insurance company by calling the number on the back of your insurance card.   Orthotic's are non-refundable and non-returnable.   Orthotics are made of various designs. Some orthotics are covered with material that extends beyond your toes. If your orthotic is of this design, you will likely need to trim the toe end to get a proper fit. The insole from your shoe can be used as a template. Simply overlay the shoe insert on top of the custom orthotic. Align the heel end while tracing the length of the insert onto the custom orthotic. Use a large scissor to trim the toe end until you get a proper fit in the shoe.   The orthotic needs to be pushed as far back in the shoe as possible. The heel portion should not ride forward so as not to irritate your heel.   Orthotics are designed to work with socks. Excessive perspiration will shorten the life span of the orthotics. Remove the orthotic from the shoe frequently for proper drying.   The break-in period lasts for weeks. People new to orthotics will likely experience new aches and pains. The orthotic is forcing your foot into a new position. Arch, foot and leg muscle aches and fatigue are common during these weeks. Minor discomfort can be considered normal break in phenomenon. Start wearing your orthotic around your home your first day. Limited activity for one to two hours is recommended. You can increase one or two additional hours each  day provided the aches and pains are subsiding. The degree of discomfort, fatigue and problems will dictate the speed of break in. You may require multiple weeks to work up to full time use.   Do not continue wearing your orthotics if they are creating problems such as blisters or sores. Do not hesitate to call the clinic to speak with a nurse regarding orthotic   break in, fit, trimming, etc. You may also need to see the doctor if the orthotics are   simply not working out. Adjustments are sometimes made to improve orthotic   function.     Orthotics will only work in certain styles and types of shoes. Orthotics rarely work in dress shoes. Slip-ons, clogs, sandals and heels are particularly troublesome. Specially designed orthotics may be necessary for these types of shoes. Your custom orthotic was designed for activities that require appropriate walking or running shoes. Lace up athletic shoes, walking shoes or work boots should work appropriately. You may need a wider or longer shoe. Shoes with a removable  or insert work best. In general, you want to remove an insert from the shoe before placing the orthotic into the shoe. Shoes without a removable liner may not work as well.     When purchasing new shoes, bring your orthotics along to get a proper fit. Shop at stores that are familiar with orthotics.   Frequent washing of the orthotic may shorten the life span of the top cover. The top cover can be replaced but will generally last one to five years depending on use and foot perspiration.

## 2024-04-02 NOTE — PROGRESS NOTES
FOOT AND ANKLE SURGERY/PODIATRY CONSULT NOTE         ASSESSMENT:   Plantar fasciitis bilateral feet  Pronation deformity bilateral feet      TREATMENT:  I have recommended orthotics.  The patient was given a cortisone injection in both heels today each consisting of 1 cc of Kenalog and 1 cc of 2% lidocaine plain.  I have asked the patient to return to the clinic in 1 week if her pain persist at which time I would recommend a second cortisone injection.        HPI:Yane De Leon presented to the clinic today complaining of bilateral heel pain.  The patient indicated that she has had a problem with heel pain for several years.  The pain is aggravated with weightbearing and ambulation.  She stated that she stands regularly throughout the day while working.  This also aggravates her pain.  She describes it as a aching type pain which is only relieved with nonweightbearing.  She denies any redness or swelling associated with her pain.  Her pain is more pronounced when she first gets out of bed in the mornings.  She has a history of wearing orthotics.  She has not had a new pair of orthotics for several years.    History reviewed. No pertinent past medical history.    Social History     Socioeconomic History    Marital status: Single     Spouse name: Not on file    Number of children: Not on file    Years of education: Not on file    Highest education level: Not on file   Occupational History    Not on file   Tobacco Use    Smoking status: Not on file    Smokeless tobacco: Not on file   Substance and Sexual Activity    Alcohol use: Not on file    Drug use: Not on file    Sexual activity: Not on file   Other Topics Concern    Not on file   Social History Narrative    Not on file     Social Determinants of Health     Financial Resource Strain: Not on file   Food Insecurity: Not on file   Transportation Needs: Not on file   Physical Activity: Not on file   Stress: Not on file   Social Connections: Not on file    Interpersonal Safety: Not on file   Housing Stability: Not on file        No Known Allergies       Current Outpatient Medications:     predniSONE (DELTASONE) 20 MG tablet, Take two tablets (= 40mg) each day for 5 (five) days (Patient not taking: Reported on 4/2/2024), Disp: 10 tablet, Rfl: 0     History reviewed. No pertinent family history.     Social History     Socioeconomic History    Marital status: Single     Spouse name: Not on file    Number of children: Not on file    Years of education: Not on file    Highest education level: Not on file   Occupational History    Not on file   Tobacco Use    Smoking status: Not on file    Smokeless tobacco: Not on file   Substance and Sexual Activity    Alcohol use: Not on file    Drug use: Not on file    Sexual activity: Not on file   Other Topics Concern    Not on file   Social History Narrative    Not on file     Social Determinants of Health     Financial Resource Strain: Not on file   Food Insecurity: Not on file   Transportation Needs: Not on file   Physical Activity: Not on file   Stress: Not on file   Social Connections: Not on file   Interpersonal Safety: Not on file   Housing Stability: Not on file        Review of Systems - Patient denies fever, chills, rash, wound, stiffness, numbness, weakness, heart burn, blood in stool, chest pain with activity, calf pain when walking, shortness of breath with activity, chronic cough, easy bleeding/bruising, swelling of ankles, excessive thirst, fatigue, depression, anxiety.  Patient admits to bilateral heel pain.      OBJECTIVE:  Appearance: alert, well appearing, and in no distress.    There were no vitals taken for this visit.     There is no height or weight on file to calculate BMI.     General appearance: Patient is alert and fully cooperative with history & exam.  No sign of distress is noted during the visit.  Psychiatric: Affect is pleasant & appropriate.  Patient appears motivated to improve health.  Respiratory:  Breathing is regular & unlabored while sitting.  HEENT: Hearing is intact to spoken word.  Speech is clear.  No gross evidence of visual impairment that would impact ambulation.    Vascular: Dorsalis pedis and posterior tibial pulses are palpable. There is no pedal hair growth bilaterally.  CFT < 3 sec from anterior tibial surface to distal digits bilaterally. There is no appreciable edema noted.  Dermatologic: Turgor and texture are within normal limits. No coloration or temperature changes. No primary or secondary lesions noted.  Neurologic: All epicritic and proprioceptive sensations are grossly intact bilaterally.  Musculoskeletal: All active and passive ankle, subtalar, midtarsal, and 1st MPJ range of motion are grossly intact without pain or crepitus, with the exception of none. Manual muscle strength is within normal limits bilaterally. All dorsiflexors, plantarflexors, invertors, evertors are intact bilaterally. Tenderness present to the plantar medial aspect of both heels on palpation.  No tenderness to bilateral feet or ankle with range of motion.  There is flattening of the medial longitudinal arch noted bilaterally.  Calf is soft/non-tender without warmth/induration    Imaging:       No images are attached to the encounter or orders placed in the encounter.     No results found.   No results found.       Vasquez Monteiro DPM  [facility] Foot & Ankle Surgery/Podiatry

## 2024-10-19 ENCOUNTER — OFFICE VISIT (OUTPATIENT)
Dept: FAMILY MEDICINE | Facility: CLINIC | Age: 64
End: 2024-10-19
Payer: COMMERCIAL

## 2024-10-19 ENCOUNTER — HOSPITAL ENCOUNTER (EMERGENCY)
Facility: HOSPITAL | Age: 64
Discharge: HOME | End: 2024-10-19
Attending: EMERGENCY MEDICINE | Admitting: EMERGENCY MEDICINE
Payer: COMMERCIAL

## 2024-10-19 VITALS
WEIGHT: 192 LBS | DIASTOLIC BLOOD PRESSURE: 63 MMHG | SYSTOLIC BLOOD PRESSURE: 141 MMHG | OXYGEN SATURATION: 95 % | HEIGHT: 65 IN | RESPIRATION RATE: 22 BRPM | BODY MASS INDEX: 31.99 KG/M2 | HEART RATE: 64 BPM | TEMPERATURE: 97.3 F

## 2024-10-19 VITALS
WEIGHT: 198.3 LBS | OXYGEN SATURATION: 96 % | RESPIRATION RATE: 14 BRPM | DIASTOLIC BLOOD PRESSURE: 76 MMHG | BODY MASS INDEX: 33 KG/M2 | HEART RATE: 78 BPM | SYSTOLIC BLOOD PRESSURE: 112 MMHG | TEMPERATURE: 98.1 F

## 2024-10-19 DIAGNOSIS — I49.3 FREQUENT PVCS: ICD-10-CM

## 2024-10-19 DIAGNOSIS — N28.9 RENAL INSUFFICIENCY: ICD-10-CM

## 2024-10-19 DIAGNOSIS — E83.51 HYPOCALCEMIA: ICD-10-CM

## 2024-10-19 DIAGNOSIS — R07.9 CHEST PAIN, UNSPECIFIED TYPE: Primary | ICD-10-CM

## 2024-10-19 LAB
ANION GAP SERPL CALCULATED.3IONS-SCNC: 10 MMOL/L (ref 7–15)
ATRIAL RATE - MUSE: 63 BPM
BUN SERPL-MCNC: 19.9 MG/DL (ref 8–23)
CALCIUM SERPL-MCNC: 8.1 MG/DL (ref 8.8–10.4)
CHLORIDE SERPL-SCNC: 108 MMOL/L (ref 98–107)
CREAT SERPL-MCNC: 1.08 MG/DL (ref 0.51–0.95)
D DIMER PPP FEU-MCNC: 0.4 UG/ML FEU (ref 0–0.5)
DIASTOLIC BLOOD PRESSURE - MUSE: NORMAL MMHG
EGFRCR SERPLBLD CKD-EPI 2021: 57 ML/MIN/1.73M2
ERYTHROCYTE [DISTWIDTH] IN BLOOD BY AUTOMATED COUNT: 13.6 % (ref 10–15)
GLUCOSE SERPL-MCNC: 103 MG/DL (ref 70–99)
HCO3 SERPL-SCNC: 23 MMOL/L (ref 22–29)
HCT VFR BLD AUTO: 41.5 % (ref 35–47)
HGB BLD-MCNC: 13.5 G/DL (ref 11.7–15.7)
INTERPRETATION ECG - MUSE: NORMAL
MAGNESIUM SERPL-MCNC: 2 MG/DL (ref 1.7–2.3)
MCH RBC QN AUTO: 29.9 PG (ref 26.5–33)
MCHC RBC AUTO-ENTMCNC: 32.5 G/DL (ref 31.5–36.5)
MCV RBC AUTO: 92 FL (ref 78–100)
P AXIS - MUSE: 43 DEGREES
PLATELET # BLD AUTO: 174 10E3/UL (ref 150–450)
POTASSIUM SERPL-SCNC: 4.1 MMOL/L (ref 3.4–5.3)
PR INTERVAL - MUSE: 146 MS
QRS DURATION - MUSE: 90 MS
QT - MUSE: 440 MS
QTC - MUSE: 450 MS
R AXIS - MUSE: 25 DEGREES
RBC # BLD AUTO: 4.51 10E6/UL (ref 3.8–5.2)
SODIUM SERPL-SCNC: 141 MMOL/L (ref 135–145)
SYSTOLIC BLOOD PRESSURE - MUSE: NORMAL MMHG
T AXIS - MUSE: 60 DEGREES
TROPONIN T SERPL HS-MCNC: 16 NG/L
TROPONIN T SERPL HS-MCNC: 18 NG/L
VENTRICULAR RATE- MUSE: 63 BPM
WBC # BLD AUTO: 6.1 10E3/UL (ref 4–11)

## 2024-10-19 PROCEDURE — 99204 OFFICE O/P NEW MOD 45 MIN: CPT | Performed by: PHYSICIAN ASSISTANT

## 2024-10-19 PROCEDURE — 99284 EMERGENCY DEPT VISIT MOD MDM: CPT

## 2024-10-19 PROCEDURE — 84484 ASSAY OF TROPONIN QUANT: CPT | Performed by: EMERGENCY MEDICINE

## 2024-10-19 PROCEDURE — 80048 BASIC METABOLIC PNL TOTAL CA: CPT | Performed by: EMERGENCY MEDICINE

## 2024-10-19 PROCEDURE — 85379 FIBRIN DEGRADATION QUANT: CPT | Performed by: PHYSICIAN ASSISTANT

## 2024-10-19 PROCEDURE — 93005 ELECTROCARDIOGRAM TRACING: CPT | Performed by: EMERGENCY MEDICINE

## 2024-10-19 PROCEDURE — 36415 COLL VENOUS BLD VENIPUNCTURE: CPT | Performed by: EMERGENCY MEDICINE

## 2024-10-19 PROCEDURE — 85027 COMPLETE CBC AUTOMATED: CPT | Performed by: PHYSICIAN ASSISTANT

## 2024-10-19 PROCEDURE — 84484 ASSAY OF TROPONIN QUANT: CPT | Performed by: PHYSICIAN ASSISTANT

## 2024-10-19 PROCEDURE — 93010 ELECTROCARDIOGRAM REPORT: CPT | Performed by: STUDENT IN AN ORGANIZED HEALTH CARE EDUCATION/TRAINING PROGRAM

## 2024-10-19 PROCEDURE — 83735 ASSAY OF MAGNESIUM: CPT | Performed by: EMERGENCY MEDICINE

## 2024-10-19 PROCEDURE — 36415 COLL VENOUS BLD VENIPUNCTURE: CPT | Performed by: PHYSICIAN ASSISTANT

## 2024-10-19 PROCEDURE — 93005 ELECTROCARDIOGRAM TRACING: CPT | Performed by: PHYSICIAN ASSISTANT

## 2024-10-19 RX ORDER — SOLIFENACIN SUCCINATE 5 MG/1
5 TABLET, FILM COATED ORAL DAILY
COMMUNITY
Start: 2024-08-09 | End: 2025-08-09

## 2024-10-19 ASSESSMENT — ENCOUNTER SYMPTOMS
DIAPHORESIS: 0
CHILLS: 0
COLOR CHANGE: 0
ABDOMINAL PAIN: 0
SHORTNESS OF BREATH: 0
COUGH: 0
COUGH: 0
EYE REDNESS: 0
PALPITATIONS: 1
PALPITATIONS: 0
FEVER: 0
EYE PAIN: 0
SORE THROAT: 0

## 2024-10-19 ASSESSMENT — ACTIVITIES OF DAILY LIVING (ADL)
ADLS_ACUITY_SCORE: 35
ADLS_ACUITY_SCORE: 35

## 2024-10-19 ASSESSMENT — COLUMBIA-SUICIDE SEVERITY RATING SCALE - C-SSRS
2. HAVE YOU ACTUALLY HAD ANY THOUGHTS OF KILLING YOURSELF IN THE PAST MONTH?: NO
6. HAVE YOU EVER DONE ANYTHING, STARTED TO DO ANYTHING, OR PREPARED TO DO ANYTHING TO END YOUR LIFE?: NO
1. IN THE PAST MONTH, HAVE YOU WISHED YOU WERE DEAD OR WISHED YOU COULD GO TO SLEEP AND NOT WAKE UP?: NO

## 2024-10-19 NOTE — ED NOTES
Expected Patient Referral to ED  3:51 PM    Referring Clinic/Provider:  Malika Verduzco NP at [facility] in clinic    Reason for referral/Clinical facts:  Left sided cp x 1 month, seen a few times with cards s/p stress test and echo.  Today the pain also went into left arm for 5 minutes, and more pleuritic than usual.  Vitals good, EKG shows occasional pvc's, and trop came in, and had mildly elevated to 18.  D-dimer pending but not yet back.  Fyi within last month also had esophageal dilation    No meds there.      Recommendations provided:  Send to ED for further evaluation    Caller was informed that this institution does possess the capabilities and/or resources to provide for patient and should be transferred to our facility.    Discussed that if direct admit is sought and any hurdles are encountered, this ED would be happy to see the patient and evaluate.    Informed caller that recommendations provided are recommendations based only on the facts provided and that they responsible to accept or reject the advice, or to seek a formal in person consultation as needed and that this ED will see/treat patient should they arrive.      Lizzy Mars MD  [facility] EMERGENCY DEPARTMENT  [address] [place] [id number]  [phone]       Lizzy Mars MD  10/19/24 [id number]

## 2024-10-19 NOTE — ED TRIAGE NOTES
Pt has been having PC for past month, pt had a stress test and an echo approx 2 weeks ago.  Pt was seen by  today and had labs drawn, cardiac biomarkers were elevated and pt was told to come to the ED.  Pt reports no change in pian, which has been continuous since it started.       Triage Assessment (Adult)       Row Name 10/19/24 0944          Triage Assessment    Airway WDL WDL        Respiratory WDL    Respiratory WDL WDL        Skin Circulation/Temperature WDL    Skin Circulation/Temperature WDL WDL        Cardiac WDL    Cardiac WDL X;chest pain        Chest Pain Assessment    Chest Pain Location anterior chest, left        Peripheral/Neurovascular WDL    Peripheral Neurovascular WDL WDL        Cognitive/Neuro/Behavioral WDL    Cognitive/Neuro/Behavioral WDL WDL

## 2024-10-19 NOTE — PROGRESS NOTES
Patient presents with:  Chest Pain: Radiates to shoulder and left arm, chest pain when taking deep breath, had an echo done recently       Clinical Decision Making:   Patient having CP that has been evaluated multiple times, but symptoms are now worsening. EKG WNL today. Single Trop drawn due to left arm involvement now when it was not present prior to today. Trop elevated. Patient directed for further serial Troponin at the ED. Patient agreeable to plan. Report given to ED provider.       ICD-10-CM    1. Chest pain, unspecified type  R07.9 EKG 12-lead, tracing only     Troponin T, High Sensitivity     D dimer, quantitative     CBC with platelets     Troponin T, High Sensitivity     D dimer, quantitative     CBC with platelets          There are no Patient Instructions on file for this visit.    HPI:  Yane De Leon is a 64 year old female who presents today with concerns of left sided chest pain lasting round 1 month. This pain is felt in the whole left chest and has been constant the last month. Sh states it worsens if she sleeps on that side.  decided to be seen today because she had a 5 minute episode this AM when the pain traveled from the left chest into her left shoulder. She also felt the pain more when she took a deep breath. Right now she denies pain radiating into the left shoulder and pain with deep inspiration. Denies shortness of breath, dizziness, light headedness, fever, increased pain with movement, or trauma. She states that she has been seen for this before but has never been to  ER.      History obtained from the patient.    Problem List:  Depression  Scoliosis  Hypothyroidism  Osteoarthritis  Fibromyalgia      No past medical history on file.    Social History     Tobacco Use    Smoking status: Never    Smokeless tobacco: Not on file   Substance Use Topics    Alcohol use: Not on file         Review of Systems   Constitutional:  Negative for chills and fever.   HENT:  Negative for  congestion and sore throat.    Eyes:  Negative for pain and redness.   Respiratory:  Negative for cough and shortness of breath.    Cardiovascular:  Positive for chest pain. Negative for palpitations.   Gastrointestinal:  Negative for abdominal pain.   Skin:  Negative for color change and rash.       Vitals:    10/19/24 1342   BP: 112/76   BP Location: Right arm   Patient Position: Sitting   Cuff Size: Adult Large   Pulse: 78   Resp: 14   Temp: 98.1  F (36.7  C)   TempSrc: Oral   SpO2: 96%   Weight: 89.9 kg (198 lb 4.8 oz)       Physical Exam  Constitutional:       Appearance: Normal appearance.   HENT:      Head: Normocephalic and atraumatic.      Right Ear: Tympanic membrane and ear canal normal.      Left Ear: Tympanic membrane and ear canal normal.      Nose: Nose normal.      Mouth/Throat:      Mouth: Mucous membranes are moist.      Pharynx: Oropharynx is clear.   Eyes:      Extraocular Movements: Extraocular movements intact.      Conjunctiva/sclera: Conjunctivae normal.      Pupils: Pupils are equal, round, and reactive to light.   Neck:      Thyroid: No thyroid mass or thyromegaly.      Trachea: Trachea normal.   Cardiovascular:      Pulses: Normal pulses.      Heart sounds: Normal heart sounds.   Pulmonary:      Effort: Pulmonary effort is normal.      Breath sounds: Normal breath sounds.   Chest:      Chest wall: Tenderness (left upper midclavicular tenderness over the chest with palpation) present. No deformity, swelling, crepitus or edema.   Musculoskeletal:      Cervical back: Neck supple.   Lymphadenopathy:      Cervical: No cervical adenopathy.   Skin:     General: Skin is warm and dry.      Findings: No lesion or rash.   Neurological:      Mental Status: She is alert.   Psychiatric:         Behavior: Behavior is cooperative.         Results:  Results for orders placed or performed during the hospital encounter of 10/19/24   Troponin T, High Sensitivity     Status: Abnormal   Result Value Ref Range     Troponin T, High Sensitivity 16 (H) <=14 ng/L   Basic metabolic panel     Status: Abnormal   Result Value Ref Range    Sodium 141 135 - 145 mmol/L    Potassium 4.1 3.4 - 5.3 mmol/L    Chloride 108 (H) 98 - 107 mmol/L    Carbon Dioxide (CO2) 23 22 - 29 mmol/L    Anion Gap 10 7 - 15 mmol/L    Urea Nitrogen 19.9 8.0 - 23.0 mg/dL    Creatinine 1.08 (H) 0.51 - 0.95 mg/dL    GFR Estimate 57 (L) >60 mL/min/1.73m2    Calcium 8.1 (L) 8.8 - 10.4 mg/dL    Glucose 103 (H) 70 - 99 mg/dL   Magnesium     Status: Normal   Result Value Ref Range    Magnesium 2.0 1.7 - 2.3 mg/dL   Results for orders placed or performed in visit on 10/19/24   Troponin T, High Sensitivity     Status: Abnormal   Result Value Ref Range    Troponin T, High Sensitivity 18 (H) <=14 ng/L   D dimer, quantitative     Status: Normal   Result Value Ref Range    D-Dimer Quantitative 0.40 0.00 - 0.50 ug/mL FEU    Narrative    This D-dimer assay is intended for use in conjunction with a clinical pretest probability assessment model to exclude pulmonary embolism (PE) and deep venous thrombosis (DVT) in outpatients suspected of PE or DVT. The cut-off value is 0.50 ug/mL FEU.    For patients 50 years of age or older, the application of age-adjusted cut-off values for D-Dimer may increase the specificity without significant effect on sensitivity. The literature suggested calculation age adjusted cut-off in ug/L = age in years x 10 ug/L. The results in this laboratory are reported as ug/mL rather than ug/L. The calculation for age adjusted cut off in ug/mL= age in years x 0.01 ug/mL. For example, the cut off for a 76 year old male is 76 x 0.01 ug/mL = 0.76 ug/mL (760 ug/L).    M Pan et al. Age adjusted D-dimer cut-off levels to rule out pulmonary embolism: The ADJUST-PE Study. DOM 2014;311:7066-1016.; HJ Edwin et al. Diagnostic accuracy of conventional or age adjusted D-dimer cutoff values in older patients with suspected venous thromboembolism. Systemic  review and meta-analysis. BMJ 2013:346:f2492.   CBC with platelets     Status: Normal   Result Value Ref Range    WBC Count 6.1 4.0 - 11.0 10e3/uL    RBC Count 4.51 3.80 - 5.20 10e6/uL    Hemoglobin 13.5 11.7 - 15.7 g/dL    Hematocrit 41.5 35.0 - 47.0 %    MCV 92 78 - 100 fL    MCH 29.9 26.5 - 33.0 pg    MCHC 32.5 31.5 - 36.5 g/dL    RDW 13.6 10.0 - 15.0 %    Platelet Count 174 150 - 450 10e3/uL   EKG 12-lead, tracing only     Status: None (Preliminary result)   Result Value Ref Range    Systolic Blood Pressure  mmHg    Diastolic Blood Pressure  mmHg    Ventricular Rate 76 BPM    Atrial Rate 76 BPM    UT Interval 148 ms    QRS Duration 82 ms     ms    QTc 452 ms    P Axis 29 degrees    R AXIS 4 degrees    T Axis 49 degrees    Interpretation ECG       Sinus rhythm with occasional Premature ventricular complexes  Otherwise normal ECG  When compared with ECG of 10-Abdoul-2009 10:14,  Premature ventricular complexes are now Present  T wave amplitude has increased in Anterior leads           Seen in conjunction with Rich Ward, DNP student.

## 2024-10-19 NOTE — DISCHARGE INSTRUCTIONS
You did not have a heart attack today to cause your symptoms or the abnormal blood work.  You do have very slightly decreased kidney function from normal and that often causes your troponin level to be the result that it is.    No further blood work is needed for this.    However, as we discussed because of your palpitations and the early beats called premature ventricular contractions, I would talk with your cardiologist about whether or not to continue your medication for your bladder spasm versus starting a medication for the PVCs which occur about every third or fourth beat today.  Generally they are safe, however, and you should not ever pass out because of them.  If you do pass out or have significant symptoms of weakness, breathing worsens, or faintness you should obviously return to the emergency department for repeat evaluation.    Continue to follow with your primary care physician for your mildly decreased kidney function and the low calcium with that.

## 2024-10-19 NOTE — ED PROVIDER NOTES
Emergency Department Encounter     Evaluation Date & Time:   10/19/2024  4:12 PM    CHIEF COMPLAINT:  Chest Pain and Abnormal Labs      Triage Note:Pt has been having PC for past month, pt had a stress test and an echo approx 2 weeks ago.  Pt was seen by  today and had labs drawn, cardiac biomarkers were elevated and pt was told to come to the ED.  Pt reports no change in pian, which has been continuous since it started.       Triage Assessment (Adult)       Row Name 10/19/24 1604          Triage Assessment    Airway WDL WDL        Respiratory WDL    Respiratory WDL WDL        Skin Circulation/Temperature WDL    Skin Circulation/Temperature WDL WDL        Cardiac WDL    Cardiac WDL X;chest pain        Chest Pain Assessment    Chest Pain Location anterior chest, left        Peripheral/Neurovascular WDL    Peripheral Neurovascular WDL WDL        Cognitive/Neuro/Behavioral WDL    Cognitive/Neuro/Behavioral WDL WDL                         FINAL IMPRESSION:    ICD-10-CM    1. Frequent PVCs  I49.3       2. Renal insufficiency  N28.9       3. Hypocalcemia  E83.51           Impression and Plan     ED COURSE & MEDICAL DECISION MAKIN:20 PM I met the patient and performed my initial interview and exam.   ED Course as of 10/19/24 1821   Sat Oct 19, 2024   1632 I reviewed her echocardiogram and stress echocardiogram from [facility].  Also, reviewed her urgent care note from today.  She has been having atypical episodes of pinching chest pain and palpitations leading to the cardiac evaluation.  There is some possible mitral regurgitation and PVCs.  She started a bladder medication for overactive bladder about a month ago and is unsure if the palpitations were occurring before that or around the same time.  Certainly depending on how this medication is helping with her overactive bladder it may be contributing to the palpitations and here today she does have more than a normal amount of PVCs with them in a  trigeminal pattern.  So, she may need to consider whether to add a medication for the PVCs or to discontinue her bladder control medication depending on how it is helping her.    Additionally today we will do blood work to look at her electrolytes including her magnesium.  She does have underlying hypothyroidism that they are following so we do not need to recheck that today.  Then again do a repeat troponin and EKG while here because of the borderline high troponin.  At 18 however with an unconvincing story for cardiac ischemia today I think this is going to be her baseline.  If the repeat is not significantly changed then no further workup is needed for this episode of chest pain today outside of what she already has completed.  She is comfortable with her plan.   1634 Labs reviewed from earlier today but no bmp or mag done on that visit.   1811 Her second troponin is not significantly changed from the first.  Her symptoms are not concerning for ischemic heart disease.  Her creatinine/GFR are just slightly abnormal which may cause the mild elevated level of troponin.  Patient can be discharged home.  With her frequent PVCs I will have her reach out to her heart care clinic to see what they recommend.       At the conclusion of the encounter I discussed the results of all the tests and the disposition. The questions were answered. The patient or family acknowledged understanding and was agreeable with the care plan.          0 minutes of critical care time        MEDICATIONS GIVEN IN THE EMERGENCY DEPARTMENT:  Medications - No data to display    NEW PRESCRIPTIONS STARTED AT TODAY'S ED VISIT:  New Prescriptions    No medications on file       HPI     HPI     Yane De Leon is a 64 year old female with a pertinent history of sleep apnea, GERD, hypothyroidism, who presents to this ED via walk-in for evaluation of chest pain and abnormal labs.    The patient has ongoing issues with continual chest pain in her left  "chest, and 1 month ago had a \"weird feeling\" in her heart like palpitations. She notes having cardiac workups and was notified she had a mitral valve leak. At 12PM today, she was sitting at work on break when she developed a tightness to her left shoulder that radiated to her left elbow, and then to her left chest; she denies diaphoresis. She presented to urgent care, where they found an elevated troponin level and sent the patient to the ER for further evaluation.    The patient has a history of hypothyroidism with thyroid medication use. She notes overactive bladder and recently started use of Vesicare for it. She has a history of occasional food bolus and issues with her esophagus for which she had a scope procedure preformed. She notes GERD and is on omeprazole, but this pain feels distinct from the chest pain. She notes chronic back pain too. She has a history of sleep apnea and occasionally gasps for breath in the middle of the day.    The patient notes chronic leg swelling. She denies recent activity change, cough, illness, recent travel.     Per chart review, the patient presented to [facility] on 19-Oct-2024 for evaluation of chest pain. 1 month of ongoing chest pain. Troponin of 18. Advised to present to the ER.     REVIEW OF SYSTEMS:  Review of Systems   Constitutional:  Negative for diaphoresis.   Respiratory:  Negative for cough.    Cardiovascular:  Positive for chest pain, palpitations and leg swelling.   Musculoskeletal:         Positive for left shoulder and elbow tightness     remainder of systems are all otherwise negative.        Medical History     History reviewed. No pertinent past medical history.    Past Surgical History:   Procedure Laterality Date    NM LAP,SLING OPERATION      Description: Laparoscopic Sling Operation For Stress Incontinence;  Recorded: 02/11/2009;    [facility] LIGATE FALLOPIAN TUBE      Description: Tubal Ligation;  Recorded: 10/08/2007;       History reviewed. " "No pertinent family history.    Social History     Tobacco Use    Smoking status: Never       escitalopram (LEXAPRO) 20 MG tablet  ferrous sulfate (FEROSUL) 325 (65 Fe) MG tablet  levothyroxine (SYNTHROID/LEVOTHROID) 200 MCG tablet  omeprazole (PRILOSEC) 20 MG DR capsule  predniSONE (DELTASONE) 20 MG tablet  solifenacin (VESICARE) 5 MG tablet        Physical Exam     First Vitals:  Patient Vitals for the past 24 hrs:   BP Temp Temp src Pulse Resp SpO2 Height Weight   10/19/24 1803 (!) 141/63 -- -- 64 22 95 % -- --   10/19/24 1748 138/65 -- -- 63 18 94 % -- --   10/19/24 1730 136/81 -- -- 61 17 93 % -- --   10/19/24 1721 -- -- -- 64 23 94 % -- --   10/19/24 1718 -- -- -- 64 23 (!) 89 % -- --   10/19/24 1715 131/62 -- -- 63 18 92 % -- --   10/19/24 1700 126/63 -- -- 64 17 91 % -- --   10/19/24 1646 133/77 -- -- 65 16 91 % -- --   10/19/24 1640 139/83 -- -- 64 15 94 % -- --   10/19/24 1630 -- -- -- 63 -- 94 % -- --   10/19/24 1620 138/86 -- -- 75 -- -- -- --   10/19/24 1602 (!) 167/88 97.3  F (36.3  C) Temporal 72 20 95 % 1.651 m (5' 5\") 87.1 kg (192 lb)       PHYSICAL EXAM:   Constitutional:   Sitting in bed and conversive   HENT:  Normocephalic, posterior pharynx wnl, mucous membranes moist and dark pink   Eyes:  PERRL, EOMI, Conjunctiva normal, No discharge, no scleral icterus.  Respiratory:  Breathing easily, lungs clear to auscultation bilaterally  Cardiovascular:  Frequent skipped beats in a trigeminal pattern. nl s1s2 0 murmurs, rubs, or gallops.  Peripheral pulses dp, pt, and radial are wnl.  1+ pitting edema in the lower extremities, equal bilaterally.  GI:  Bowel sounds normal, Soft, No tenderness, No flank tenderness, nondistended.  :No CVA tenderness.   Musculoskeletal:  Moves all extremities.  No erythematous or swollen major joints,   Integument:  Normal color  Neurologic:  Alert & oriented x 3, Normal motor function, Normal sensory function, No focal deficits noted. Normal speech.  Psychiatric:  Affect " normal, Judgment normal, Mood normal.     Results     LAB AND RADIOLOGY:  All pertinent labs reviewed and interpreted  Results for orders placed or performed during the hospital encounter of 10/19/24   Troponin T, High Sensitivity     Status: Abnormal   Result Value Ref Range    Troponin T, High Sensitivity 16 (H) <=14 ng/L   Basic metabolic panel     Status: Abnormal   Result Value Ref Range    Sodium 141 135 - 145 mmol/L    Potassium 4.1 3.4 - 5.3 mmol/L    Chloride 108 (H) 98 - 107 mmol/L    Carbon Dioxide (CO2) 23 22 - 29 mmol/L    Anion Gap 10 7 - 15 mmol/L    Urea Nitrogen 19.9 8.0 - 23.0 mg/dL    Creatinine 1.08 (H) 0.51 - 0.95 mg/dL    GFR Estimate 57 (L) >60 mL/min/1.73m2    Calcium 8.1 (L) 8.8 - 10.4 mg/dL    Glucose 103 (H) 70 - 99 mg/dL   Magnesium     Status: Normal   Result Value Ref Range    Magnesium 2.0 1.7 - 2.3 mg/dL   Results for orders placed or performed in visit on 10/19/24   Troponin T, High Sensitivity     Status: Abnormal   Result Value Ref Range    Troponin T, High Sensitivity 18 (H) <=14 ng/L   D dimer, quantitative     Status: Normal   Result Value Ref Range    D-Dimer Quantitative 0.40 0.00 - 0.50 ug/mL FEU    Narrative    This D-dimer assay is intended for use in conjunction with a clinical pretest probability assessment model to exclude pulmonary embolism (PE) and deep venous thrombosis (DVT) in outpatients suspected of PE or DVT. The cut-off value is 0.50 ug/mL FEU.    For patients 50 years of age or older, the application of age-adjusted cut-off values for D-Dimer may increase the specificity without significant effect on sensitivity. The literature suggested calculation age adjusted cut-off in ug/L = age in years x 10 ug/L. The results in this laboratory are reported as ug/mL rather than ug/L. The calculation for age adjusted cut off in ug/mL= age in years x 0.01 ug/mL. For example, the cut off for a 76 year old male is 76 x 0.01 ug/mL = 0.76 ug/mL (760 ug/L).    APARNA Perla et al.  Age adjusted D-dimer cut-off levels to rule out pulmonary embolism: The ADJUST-PE Study. DOM 2014;311:8725-4365.; HJ Edwin et al. Diagnostic accuracy of conventional or age adjusted D-dimer cutoff values in older patients with suspected venous thromboembolism. Systemic review and meta-analysis. BMJ 2013:346:f2492.   CBC with platelets     Status: Normal   Result Value Ref Range    WBC Count 6.1 4.0 - 11.0 10e3/uL    RBC Count 4.51 3.80 - 5.20 10e6/uL    Hemoglobin 13.5 11.7 - 15.7 g/dL    Hematocrit 41.5 35.0 - 47.0 %    MCV 92 78 - 100 fL    MCH 29.9 26.5 - 33.0 pg    MCHC 32.5 31.5 - 36.5 g/dL    RDW 13.6 10.0 - 15.0 %    Platelet Count 174 150 - 450 10e3/uL   EKG 12-lead, tracing only     Status: None (Preliminary result)   Result Value Ref Range    Systolic Blood Pressure  mmHg    Diastolic Blood Pressure  mmHg    Ventricular Rate 76 BPM    Atrial Rate 76 BPM    NC Interval 148 ms    QRS Duration 82 ms     ms    QTc 452 ms    P Axis 29 degrees    R AXIS 4 degrees    T Axis 49 degrees    Interpretation ECG       Sinus rhythm with occasional Premature ventricular complexes  Otherwise normal ECG  When compared with ECG of 10-Abdoul-2009 10:14,  Premature ventricular complexes are now Present  T wave amplitude has increased in Anterior leads           ECG:    Performed at: 19-Oct-2024 16:08    Impression: sinus rhythm with premature ventricular complexes    Rate: 63  Rhythm: sinus  Axis: 25  NC Interval: 146  QRS Interval: 90  QTc Interval: 450  ST Changes: No ST changes.  Comparison: When compared with ECG of 19-Oct-2024 14:48, no significant change was found.     I have independently reviewed and interpreted the EKS(s) documented above    PROCEDURES:  Procedures:      [facility] Documentation     Medical Decision Making  Obtained supplemental history:Supplemental history obtained?: No  Reviewed external records: External records reviewed?: Documented in chart  Care impacted by chronic  illness:Chronic Pain, Hyperlipidemia, Mental Health, and Other: GERD, hypothyroidism  Did you consider but not order tests?: Work up considered but not performed and documented in chart, if applicable  Did you interpret images independently?: Independent interpretation of ECG and images noted in documentation, when applicable.  Consultation discussion with other provider:Did you involve another provider (consultant, MH, pharmacy, etc.)?: No  Discharge. I recommended the patient continue their current prescription strength medication(s):  . See documentation for any additional details.       The creation of this record is based on the scribe s observations of the work being performed by Jesse Kimball and the provider s statements to them. This document has been checked and approved by MD Lizzy Kelly MD  Emergency Medicine  [facility] EMERGENCY DEPARTMENT         Lizzy Mars MD  10/19/24 [id number]       Lizzy Mars MD  10/19/24 3817

## 2024-10-20 LAB
ATRIAL RATE - MUSE: 76 BPM
DIASTOLIC BLOOD PRESSURE - MUSE: NORMAL MMHG
INTERPRETATION ECG - MUSE: NORMAL
P AXIS - MUSE: 29 DEGREES
PR INTERVAL - MUSE: 148 MS
QRS DURATION - MUSE: 82 MS
QT - MUSE: 402 MS
QTC - MUSE: 452 MS
R AXIS - MUSE: 4 DEGREES
SYSTOLIC BLOOD PRESSURE - MUSE: NORMAL MMHG
T AXIS - MUSE: 49 DEGREES
VENTRICULAR RATE- MUSE: 76 BPM

## 2025-02-11 ENCOUNTER — APPOINTMENT (OUTPATIENT)
Dept: CT IMAGING | Facility: HOSPITAL | Age: 65
End: 2025-02-11
Attending: EMERGENCY MEDICINE
Payer: COMMERCIAL

## 2025-02-11 ENCOUNTER — HOSPITAL ENCOUNTER (EMERGENCY)
Facility: HOSPITAL | Age: 65
Discharge: HOME | End: 2025-02-11
Attending: EMERGENCY MEDICINE | Admitting: EMERGENCY MEDICINE
Payer: COMMERCIAL

## 2025-02-11 VITALS
SYSTOLIC BLOOD PRESSURE: 125 MMHG | TEMPERATURE: 98 F | DIASTOLIC BLOOD PRESSURE: 84 MMHG | BODY MASS INDEX: 32.45 KG/M2 | HEART RATE: 82 BPM | WEIGHT: 195 LBS | RESPIRATION RATE: 18 BRPM | OXYGEN SATURATION: 95 %

## 2025-02-11 DIAGNOSIS — R91.1 PULMONARY NODULE: ICD-10-CM

## 2025-02-11 DIAGNOSIS — R07.81 PLEURITIC CHEST PAIN: ICD-10-CM

## 2025-02-11 PROBLEM — M54.42 CHRONIC BILATERAL LOW BACK PAIN WITH LEFT-SIDED SCIATICA: Status: ACTIVE | Noted: 2022-08-30

## 2025-02-11 PROBLEM — R79.89 SERUM CREATININE RAISED: Status: ACTIVE | Noted: 2022-07-01

## 2025-02-11 PROBLEM — F33.42 MAJOR DEPRESSIVE DISORDER, RECURRENT EPISODE, IN FULL REMISSION: Status: ACTIVE | Noted: 2022-07-01

## 2025-02-11 PROBLEM — G89.29 CHRONIC BILATERAL LOW BACK PAIN WITH LEFT-SIDED SCIATICA: Status: ACTIVE | Noted: 2022-08-30

## 2025-02-11 LAB
ANION GAP SERPL CALCULATED.3IONS-SCNC: 9 MMOL/L (ref 7–15)
BASOPHILS # BLD AUTO: 0.1 10E3/UL (ref 0–0.2)
BASOPHILS NFR BLD AUTO: 1 %
BUN SERPL-MCNC: 14 MG/DL (ref 8–23)
CALCIUM SERPL-MCNC: 9.6 MG/DL (ref 8.8–10.4)
CHLORIDE SERPL-SCNC: 107 MMOL/L (ref 98–107)
CREAT SERPL-MCNC: 1.05 MG/DL (ref 0.51–0.95)
D DIMER PPP FEU-MCNC: 0.8 UG/ML FEU (ref 0–0.5)
EGFRCR SERPLBLD CKD-EPI 2021: 59 ML/MIN/1.73M2
EOSINOPHIL # BLD AUTO: 0.1 10E3/UL (ref 0–0.7)
EOSINOPHIL NFR BLD AUTO: 2 %
ERYTHROCYTE [DISTWIDTH] IN BLOOD BY AUTOMATED COUNT: 13.1 % (ref 10–15)
GLUCOSE SERPL-MCNC: 98 MG/DL (ref 70–99)
HCO3 SERPL-SCNC: 26 MMOL/L (ref 22–29)
HCT VFR BLD AUTO: 42.7 % (ref 35–47)
HGB BLD-MCNC: 13.8 G/DL (ref 11.7–15.7)
IMM GRANULOCYTES # BLD: 0.1 10E3/UL
IMM GRANULOCYTES NFR BLD: 1 %
LYMPHOCYTES # BLD AUTO: 1.8 10E3/UL (ref 0.8–5.3)
LYMPHOCYTES NFR BLD AUTO: 28 %
MCH RBC QN AUTO: 29.5 PG (ref 26.5–33)
MCHC RBC AUTO-ENTMCNC: 32.3 G/DL (ref 31.5–36.5)
MCV RBC AUTO: 91 FL (ref 78–100)
MONOCYTES # BLD AUTO: 0.5 10E3/UL (ref 0–1.3)
MONOCYTES NFR BLD AUTO: 8 %
NEUTROPHILS # BLD AUTO: 3.9 10E3/UL (ref 1.6–8.3)
NEUTROPHILS NFR BLD AUTO: 61 %
NRBC # BLD AUTO: 0 10E3/UL
NRBC BLD AUTO-RTO: 0 /100
PLATELET # BLD AUTO: 205 10E3/UL (ref 150–450)
POTASSIUM SERPL-SCNC: 4.4 MMOL/L (ref 3.4–5.3)
RBC # BLD AUTO: 4.68 10E6/UL (ref 3.8–5.2)
SODIUM SERPL-SCNC: 142 MMOL/L (ref 135–145)
TROPONIN T SERPL HS-MCNC: 13 NG/L
TROPONIN T SERPL HS-MCNC: 15 NG/L
WBC # BLD AUTO: 6.4 10E3/UL (ref 4–11)

## 2025-02-11 PROCEDURE — 82565 ASSAY OF CREATININE: CPT | Performed by: EMERGENCY MEDICINE

## 2025-02-11 PROCEDURE — 250N000011 HC RX IP 250 OP 636: Performed by: EMERGENCY MEDICINE

## 2025-02-11 PROCEDURE — 71275 CT ANGIOGRAPHY CHEST: CPT

## 2025-02-11 PROCEDURE — 85025 COMPLETE CBC W/AUTO DIFF WBC: CPT | Performed by: EMERGENCY MEDICINE

## 2025-02-11 PROCEDURE — 93005 ELECTROCARDIOGRAM TRACING: CPT | Performed by: EMERGENCY MEDICINE

## 2025-02-11 PROCEDURE — 99285 EMERGENCY DEPT VISIT HI MDM: CPT | Mod: 25

## 2025-02-11 PROCEDURE — 36415 COLL VENOUS BLD VENIPUNCTURE: CPT | Performed by: EMERGENCY MEDICINE

## 2025-02-11 PROCEDURE — 250N000013 HC RX MED GY IP 250 OP 250 PS 637: Performed by: EMERGENCY MEDICINE

## 2025-02-11 PROCEDURE — 96374 THER/PROPH/DIAG INJ IV PUSH: CPT | Mod: 59

## 2025-02-11 PROCEDURE — 85379 FIBRIN DEGRADATION QUANT: CPT | Performed by: EMERGENCY MEDICINE

## 2025-02-11 PROCEDURE — 84484 ASSAY OF TROPONIN QUANT: CPT | Performed by: EMERGENCY MEDICINE

## 2025-02-11 RX ORDER — IOPAMIDOL 755 MG/ML
75 INJECTION, SOLUTION INTRAVASCULAR ONCE
Status: COMPLETED | OUTPATIENT
Start: 2025-02-11 | End: 2025-02-11

## 2025-02-11 RX ORDER — ASPIRIN 81 MG/1
324 TABLET, CHEWABLE ORAL ONCE
Status: COMPLETED | OUTPATIENT
Start: 2025-02-11 | End: 2025-02-11

## 2025-02-11 RX ORDER — KETOROLAC TROMETHAMINE 30 MG/ML
30 INJECTION, SOLUTION INTRAMUSCULAR; INTRAVENOUS ONCE
Status: COMPLETED | OUTPATIENT
Start: 2025-02-11 | End: 2025-02-11

## 2025-02-11 RX ADMIN — IOPAMIDOL 75 ML: 755 INJECTION, SOLUTION INTRAVENOUS at 14:51

## 2025-02-11 RX ADMIN — KETOROLAC TROMETHAMINE 30 MG: 30 INJECTION, SOLUTION INTRAMUSCULAR at 14:22

## 2025-02-11 RX ADMIN — ASPIRIN 81 MG CHEWABLE TABLET 324 MG: 81 TABLET CHEWABLE at 14:20

## 2025-02-11 ASSESSMENT — COLUMBIA-SUICIDE SEVERITY RATING SCALE - C-SSRS
1. IN THE PAST MONTH, HAVE YOU WISHED YOU WERE DEAD OR WISHED YOU COULD GO TO SLEEP AND NOT WAKE UP?: NO
6. HAVE YOU EVER DONE ANYTHING, STARTED TO DO ANYTHING, OR PREPARED TO DO ANYTHING TO END YOUR LIFE?: NO
2. HAVE YOU ACTUALLY HAD ANY THOUGHTS OF KILLING YOURSELF IN THE PAST MONTH?: NO

## 2025-02-11 NOTE — ED TRIAGE NOTES
Patient arrives by private car for evaluation of left sided chest pain that began about 2 hours ago.  Rates 8/10, states increases with deep breath.  Also states that she feels short of breath.  Denies nausea.

## 2025-02-11 NOTE — ED PROVIDER NOTES
EMERGENCY DEPARTMENT ENCOUNTER      NAME: Yane De Leon  AGE: 64 year old female  YOB: 1960  MRN: [id number]  EVALUATION DATE & TIME: No admission date for patient encounter.    PCP: [facility]    ED PROVIDER: Vania Loomis MD    Chief Complaint   Patient presents with    Chest Pain         FINAL IMPRESSION:  1. Pleuritic chest pain    2. Pulmonary nodule          ED COURSE & MEDICAL DECISION MAKIN:35 PM I met with the patient, obtained history, performed an initial exam, and discussed options and plan for diagnostics and treatment here in the ED.  3:51 PM Signed out care to Dr. Lopez.    Pertinent Labs & Imaging studies reviewed. (See chart for details)  64 year old female with history of GERD, HLD, depression who presents to the Emergency Department for evaluation of pleuritic type left-sided chest pain starting 2 hours prior to arrival worse with deep breath.  Pain is very reproducible with palpation on examination.  Hide Cheli failure musculoskeletal etiology.  She has had a recent cold.  Differential includes pleurisy as well as PE, ACS.    Patient initially seen evaluate myself in triage area due to boarding crisis.  Twelve-lead EKG shows sinus rhythm with frequent PVCs.  Patient given aspirin, Toradol.  CBC, BMP unremarkable.  D-dimer elevated at 0.8, however CT PE study negative for PE and shows incidental pulmonary nodule.  Patient informed of same and need for outpatient follow-up.  Troponin elevated in indeterminate range at 15.  Will repeat, as long as this is stable I feel comfortable discharging her to home.  Signed out to oncoming physician awaiting delta troponin.      ED Course as of 25 1552   Tue 2025   1417 D-Dimer Quantitative(!): 0.80       Medical Decision Making  Obtained supplemental history:Supplemental history obtained?: No  Reviewed external records: External records reviewed?: Outpatient Record: 2024 cardiology visit  and associated recent stress echo  Care impacted by chronic illness:Hyperlipidemia and Mental Health  Did you consider but not order tests?: Work up considered but not performed and documented in chart, if applicable  Did you interpret images independently?: Independent interpretation of ECG and images noted in documentation, when applicable.  Consultation discussion with other provider:Did you involve another provider (consultant, , pharmacy, etc.)?: No  Admission considered. Patient was signed out to the oncoming physician, disposition pending.    MIPS: Not Applicable      At the conclusion of the encounter I discussed the results of all of the tests and the disposition. The questions were answered. The patient or family acknowledged understanding and was agreeable with the care plan.      MEDICATIONS GIVEN IN THE EMERGENCY:  Medications   aspirin (ASA) chewable tablet 324 mg (324 mg Oral $Given 2/11/25 1420)   ketorolac (TORADOL) injection 30 mg (30 mg Intravenous $Given 2/11/25 1422)   iopamidol (ISOVUE-370) solution 75 mL (75 mLs Intravenous $Given 2/11/25 1451)       NEW PRESCRIPTIONS STARTED AT TODAY'S ER VISIT  New Prescriptions    No medications on file          =================================================================    HPI    Patient information was obtained from: Patient    Use of Intrepreter: N/A       Yane De Leon is a 64 year old female with pertinent medical history of ERD, HLD and depression, who presents with chest pain.    Patient reports she started experiencing left-sided chest pain 45 minutes PTA to the ED. She rates this chest pain a 7/10. She states the pain has been constant since onset. Notes there is not aggravating or alleviating factors with this chest pain. This pain radiates under her left arm. This pain worsens when she takes a deep breath. She has a history of PVCs, but states this does not feel like her previous PVCs. She also feels short of breath with this pain.  She denies feeling nauseated.    PAST MEDICAL HISTORY:  No past medical history on file.    PAST SURGICAL HISTORY:  Past Surgical History:   Procedure Laterality Date    MT LAP,SLING OPERATION      Description: Laparoscopic Sling Operation For Stress Incontinence;  Recorded: 02/11/2009;    [facility] LIGATE FALLOPIAN TUBE      Description: Tubal Ligation;  Recorded: 10/08/2007;       CURRENT MEDICATIONS:    Cannot display prior to admission medications because the patient has not been admitted in this contact.       ALLERGIES:  No Known Allergies    FAMILY HISTORY:  No family history on file.    SOCIAL HISTORY:  Social History     Tobacco Use    Smoking status: Never        VITALS:  Patient Vitals for the past 24 hrs:   BP Temp Temp src Pulse Resp SpO2 Weight   02/11/25 1259 125/84 98  F (36.7  C) Oral 82 18 95 % 88.5 kg (195 lb)       PHYSICAL EXAM    General Appearance: Well-appearing, well-nourished, no acute distress  Head:  Normocephalic  Chest: Producible tenderness to palpation of the chest wall just superior to the left breast  Cardio:  Slightly irregular rhythm, regular rate, no murmur/gallop/rub, 2+ pulses symmetric in all extremities  Pulm:  No respiratory distress, clear to auscultation bilaterally  Extremities: Normal gait, no peripheral edema  Skin:  Skin warm, dry, no rashes  Neuro:  Alert and oriented ×3     RADIOLOGY/LABS:  Reviewed all pertinent imaging. Please see official radiology report. All pertinent labs reviewed and interpreted.    Results for orders placed or performed during the hospital encounter of 02/11/25   CT Chest Pulmonary Embolism w Contrast    Impression    IMPRESSION:    1.  No acute pulmonary embolism or aortopathy.  2.  No acute lung parenchymal or pleural space process.  3.  Solitary 2 to 3 mm noncalcified nodule anterior left upper lobe. No follow-up needed if patient is low-risk. Non-contrast chest CT can be considered in 12 months if patient is high-risk for lung cancer (Per  Fleischner Society guidelines).     Basic metabolic panel   Result Value Ref Range    Sodium 142 135 - 145 mmol/L    Potassium 4.4 3.4 - 5.3 mmol/L    Chloride 107 98 - 107 mmol/L    Carbon Dioxide (CO2) 26 22 - 29 mmol/L    Anion Gap 9 7 - 15 mmol/L    Urea Nitrogen 14.0 8.0 - 23.0 mg/dL    Creatinine 1.05 (H) 0.51 - 0.95 mg/dL    GFR Estimate 59 (L) >60 mL/min/1.73m2    Calcium 9.6 8.8 - 10.4 mg/dL    Glucose 98 70 - 99 mg/dL   Result Value Ref Range    Troponin T, High Sensitivity 15 (H) <=14 ng/L   D dimer quantitative   Result Value Ref Range    D-Dimer Quantitative 0.80 (H) 0.00 - 0.50 ug/mL FEU   CBC with platelets and differential   Result Value Ref Range    WBC Count 6.4 4.0 - 11.0 10e3/uL    RBC Count 4.68 3.80 - 5.20 10e6/uL    Hemoglobin 13.8 11.7 - 15.7 g/dL    Hematocrit 42.7 35.0 - 47.0 %    MCV 91 78 - 100 fL    MCH 29.5 26.5 - 33.0 pg    MCHC 32.3 31.5 - 36.5 g/dL    RDW 13.1 10.0 - 15.0 %    Platelet Count 205 150 - 450 10e3/uL    % Neutrophils 61 %    % Lymphocytes 28 %    % Monocytes 8 %    % Eosinophils 2 %    % Basophils 1 %    % Immature Granulocytes 1 %    NRBCs per 100 WBC 0 <1 /100    Absolute Neutrophils 3.9 1.6 - 8.3 10e3/uL    Absolute Lymphocytes 1.8 0.8 - 5.3 10e3/uL    Absolute Monocytes 0.5 0.0 - 1.3 10e3/uL    Absolute Eosinophils 0.1 0.0 - 0.7 10e3/uL    Absolute Basophils 0.1 0.0 - 0.2 10e3/uL    Absolute Immature Granulocytes 0.1 <=0.4 10e3/uL    Absolute NRBCs 0.0 10e3/uL       EKG:  Performed at: 2/11/2025 12:54:04    Impression: Sinus rhythm with frequent Premature ventricular complexes. Otherwise normal ECG.    Rate: 75 BPM  Rhythm: Sinus  Axis: 28  VT Interval: 152 ms  QRS Interval: 88 ms  QTc Interval: 460 ms  ST Changes: No ST changes.  Comparison: When compared with ECG of 19-Oct-2024 16:08, No significant change was found.    I have independently reviewed and interpreted the EKG(s) documented above.    The creation of this record is based on the scribe s observations of  the work being performed by Vania Loomis MD and the provider s statements to them. It was created on her behalf by Rohini Meeks, a trained medical scribe. This document has been checked and approved by the attending provider.    Vania Loomis MD  Emergency Medicine  [facility] EMERGENCY DEPARTMENT  [address] [place] [id number]  [phone]  Dept: [phone]

## 2025-02-11 NOTE — DISCHARGE INSTRUCTIONS
Solitary 2 to 3 mm noncalcified nodule anterior left upper lobe. No follow-up needed if patient is low-risk. Non-contrast chest CT can be considered in 12 months if patient is high-risk for lung cancer (Per Fleischner Society guidelines).     Your heart tests were both normal today.  You will need to follow up with your primary doctor for both your symptoms and the nodule described above.

## 2025-02-15 ENCOUNTER — HEALTH MAINTENANCE LETTER (OUTPATIENT)
Age: 65
End: 2025-02-15

## 2025-02-20 LAB
ATRIAL RATE - MUSE: 75 BPM
DIASTOLIC BLOOD PRESSURE - MUSE: NORMAL MMHG
INTERPRETATION ECG - MUSE: NORMAL
P AXIS - MUSE: 46 DEGREES
PR INTERVAL - MUSE: 152 MS
QRS DURATION - MUSE: 88 MS
QT - MUSE: 412 MS
QTC - MUSE: 460 MS
R AXIS - MUSE: 28 DEGREES
SYSTOLIC BLOOD PRESSURE - MUSE: NORMAL MMHG
T AXIS - MUSE: 60 DEGREES
VENTRICULAR RATE- MUSE: 75 BPM

## 2025-03-13 ENCOUNTER — OFFICE VISIT (OUTPATIENT)
Dept: CARDIOLOGY | Facility: CLINIC | Age: 65
End: 2025-03-13
Payer: COMMERCIAL

## 2025-03-13 VITALS
DIASTOLIC BLOOD PRESSURE: 77 MMHG | RESPIRATION RATE: 14 BRPM | SYSTOLIC BLOOD PRESSURE: 116 MMHG | WEIGHT: 195 LBS | HEART RATE: 71 BPM | BODY MASS INDEX: 32.45 KG/M2

## 2025-03-13 DIAGNOSIS — N18.31 CHRONIC KIDNEY DISEASE, STAGE 3A (H): ICD-10-CM

## 2025-03-13 DIAGNOSIS — I49.3 PVC'S (PREMATURE VENTRICULAR CONTRACTIONS): ICD-10-CM

## 2025-03-13 DIAGNOSIS — E03.9 HYPOTHYROIDISM, UNSPECIFIED TYPE: ICD-10-CM

## 2025-03-13 DIAGNOSIS — E78.00 PURE HYPERCHOLESTEROLEMIA: Primary | ICD-10-CM

## 2025-03-13 LAB
CHOLEST SERPL-MCNC: 169 MG/DL
FASTING STATUS PATIENT QL REPORTED: ABNORMAL
HDLC SERPL-MCNC: 48 MG/DL
LDLC SERPL CALC-MCNC: 65 MG/DL
NONHDLC SERPL-MCNC: 121 MG/DL
T4 FREE SERPL-MCNC: 0.92 NG/DL (ref 0.9–1.7)
TRIGL SERPL-MCNC: 279 MG/DL
TSH SERPL DL<=0.005 MIU/L-ACNC: 6.64 UIU/ML (ref 0.3–4.2)

## 2025-03-13 RX ORDER — GABAPENTIN 300 MG/1
CAPSULE ORAL
COMMUNITY
Start: 2024-09-03

## 2025-03-13 RX ORDER — ROSUVASTATIN CALCIUM 10 MG/1
10 TABLET, COATED ORAL DAILY
COMMUNITY
Start: 2024-11-11 | End: 2025-11-11

## 2025-03-13 RX ORDER — METOPROLOL SUCCINATE 25 MG/1
1 TABLET, EXTENDED RELEASE ORAL DAILY
COMMUNITY
Start: 2024-11-15 | End: 2025-11-15

## 2025-03-13 NOTE — LETTER
3/13/2025    Physician No Ref-Primary  No address on file    RE: Yane De Leon       Dear Colleague,     I had the pleasure of seeing Yane De Leon in the [facility].    [facility]ARE   [address]  [place] [address]   [email or web address]   OFFICE: [phone]     CARDIOLOGY CLINIC NOTE     Thank you, Dr. Burgos Ref-Primary, Physician, for asking the [facility] team to see Ms. Yane De Leon to evaluate .        History of Present Illness   Ms. Yane De Leon is a 64 year old female w h/o PVCs, HTN, atypical CP (neg TM, nl echo - OLIVEIRA at [facility] last year). GERD, HLD.  Seen in the ER a month ago for pleuritic chest pain D-dimer was elevated therefore CT scan was done which was negative for PE, showed incidental pulmonary, also showed minimal CAC and elevating.  Troponin was minimally elevated at 15 (has been elevated in the past as well)    #CP-occurs at rest usually when lying down, no exertional symptoms; has been evaluated previously at [facility], negative treadmill stress test; no symptoms since ER visit    # PVCs: Asymptomatic.  19% burden on prior monitor; on low-dose beta-blocker.  Echo was normal.  She has history of hypothyroidism and is not being monitored at present as she has to establish care with a new PCP.  No recent TSH T4 done    #HLD: With minimal to mild coronary calcification; on statin.         Medication#s  Allergies   Current Outpatient Medications   Medication Sig Dispense Refill     escitalopram (LEXAPRO) 20 MG tablet Take 20 mg by mouth daily       ferrous sulfate (FEROSUL) 325 (65 Fe) MG tablet Take 65 mg by mouth       levothyroxine (SYNTHROID/LEVOTHROID) 200 MCG tablet Take 1 tablet by mouth daily       omeprazole (PRILOSEC) 20 MG DR capsule Take 20 mg by mouth       solifenacin (VESICARE) 5 MG tablet Take 5 mg by mouth daily.       predniSONE (DELTASONE) 20 MG tablet Take two tablets (= 40mg)  each day for 5 (five) days 10 tablet 0      Not on File     Physical Examination Review of Systems   Vitals: There were no vitals taken for this visit.  BMI= There is no height or weight on file to calculate BMI.  Wt Readings from Last 3 Encounters:   10/19/24 87.1 kg (192 lb)   10/19/24 89.9 kg (198 lb 4.8 oz)   03/10/23 84.8 kg (187 lb)       General: pleasant female. No acute distress.   Neck: No JVD  Lungs: clear to auscultation  COR:  regular rate and rhythm, No murmurs, rubs, or gallops  Extrem: No edema        Please refer above for cardiac ROS details.       Past History     Family History: No family history on file.     Social History:   Social History     Socioeconomic History     Marital status: Single     Spouse name: Not on file     Number of children: Not on file     Years of education: Not on file     Highest education level: Not on file   Occupational History     Not on file   Tobacco Use     Smoking status: Never     Smokeless tobacco: Not on file   Substance and Sexual Activity     Alcohol use: Not on file     Drug use: Not on file     Sexual activity: Not on file   Other Topics Concern     Not on file   Social History Narrative     Not on file     Social Drivers of Health     Financial Resource Strain: Not on file   Food Insecurity: No Food Insecurity (9/5/2024)    Received from [employer]    Hunger Vital Sign      Worried About Running Out of Food in the Last Year: Never true      Ran Out of Food in the Last Year: Never true   Transportation Needs: No Transportation Needs (9/5/2024)    Received from [employer]    PRAPARE - Transportation      Lack of Transportation (Medical): No      Lack of Transportation (Non-Medical): No   Physical Activity: Not on file   Stress: Not on file   Social Connections: Not on file   Interpersonal Safety: Not on file   Housing Stability: High Risk (9/5/2024)    Received from [employer]    Housing Stability Vital Sign      Unable to Pay for Housing in the  "Last Year: Yes      Number of Times Moved in the Last Year: 1      Homeless in the Last Year: No            Lab Results    Chemistry/lipid CBC Cardiac Enzymes/BNP/TSH/INR   Lab Results   Component Value Date    CHOL 178 06/04/2010    HDL 67 (H) 06/04/2010    TRIG 77 06/04/2010    BUN 14.0 02/11/2025     02/11/2025    CO2 26 02/11/2025    Lab Results   Component Value Date    WBC 6.4 02/11/2025    HGB 13.8 02/11/2025    HCT 42.7 02/11/2025    MCV 91 02/11/2025     02/11/2025    No results found for: \"CKTOTAL\", \"CKMB\", \"TROPONINI\", \"BNP\", \"TSH\", \"INR\"       Cardiac Problems and Cardiac Diagnostics     Most Recent Cardiac testing:  ECG Personal interpretation 2/11/2025: Sinus rhythm with frequent PVCs    CTPE 2/11/25: Minimal calcified plaque in the proximal LAD.        Assessment/Recommendations   Assessment:    Chest pain: Atypical symptoms, prior workup unremarkable.  See #2 for workup    PVCs: Asymptomatic, significant PVC burden.  Prior echocardiogram at [facility] was unremarkable.  No sustained VT episodes were detected on Zio patch report.  Recommend cardiac MRI along with stress imaging given chronic troponin elevation.        TSH/free T4 ordered, repeat 2-week monitor to assess PVC burden on low-dose beta-blocker, may need to adjust dose further.    Troponin elevation:  MRI per # 3  HLD: lipid panel ordered  CAC: on statin, lipid panel ordered         Eduardo Foy MD, MPH  Non-invasive Cardiologist  [facility]         Thank you for allowing me to participate in the care of your patient.      Sincerely,     Eduardo BRAUN [facility]  cc:   Referred Self, MD  No address on file      "

## 2025-03-13 NOTE — PROGRESS NOTES
[facility] HEART CARE   [address]  [place] [address]   [email or web address]   OFFICE: [phone]     CARDIOLOGY CLINIC NOTE     Thank you, Dr. Loretta Aleman-Primary, Physician, for asking the [facility] team to see Ms. Yane De Leon to evaluate .        History of Present Illness   Ms. Yane De Leon is a 64 year old female w h/o PVCs, HTN, atypical CP (neg TM, nl echo - OLIVEIRA at [facility] last year). GERD, HLD.  Seen in the ER a month ago for pleuritic chest pain D-dimer was elevated therefore CT scan was done which was negative for PE, showed incidental pulmonary, also showed minimal CAC and elevating.  Troponin was minimally elevated at 15 (has been elevated in the past as well)    #CP-occurs at rest usually when lying down, no exertional symptoms; has been evaluated previously at [facility], negative treadmill stress test; no symptoms since ER visit    # PVCs: Asymptomatic.  19% burden on prior monitor; on low-dose beta-blocker.  Echo was normal.  She has history of hypothyroidism and is not being monitored at present as she has to establish care with a new PCP.  No recent TSH T4 done    #HLD: With minimal to mild coronary calcification; on statin.         Medication#s  Allergies   Current Outpatient Medications   Medication Sig Dispense Refill    escitalopram (LEXAPRO) 20 MG tablet Take 20 mg by mouth daily      ferrous sulfate (FEROSUL) 325 (65 Fe) MG tablet Take 65 mg by mouth      levothyroxine (SYNTHROID/LEVOTHROID) 200 MCG tablet Take 1 tablet by mouth daily      omeprazole (PRILOSEC) 20 MG DR capsule Take 20 mg by mouth      solifenacin (VESICARE) 5 MG tablet Take 5 mg by mouth daily.      predniSONE (DELTASONE) 20 MG tablet Take two tablets (= 40mg) each day for 5 (five) days 10 tablet 0      Not on File     Physical Examination Review of Systems   Vitals: There were no vitals taken for this visit.  BMI= There is no height or weight on file to  calculate BMI.  Wt Readings from Last 3 Encounters:   10/19/24 87.1 kg (192 lb)   10/19/24 89.9 kg (198 lb 4.8 oz)   03/10/23 84.8 kg (187 lb)       General: pleasant female. No acute distress.   Neck: No JVD  Lungs: clear to auscultation  COR:  regular rate and rhythm, No murmurs, rubs, or gallops  Extrem: No edema        Please refer above for cardiac ROS details.       Past History     Family History: No family history on file.     Social History:   Social History     Socioeconomic History    Marital status: Single     Spouse name: Not on file    Number of children: Not on file    Years of education: Not on file    Highest education level: Not on file   Occupational History    Not on file   Tobacco Use    Smoking status: Never    Smokeless tobacco: Not on file   Substance and Sexual Activity    Alcohol use: Not on file    Drug use: Not on file    Sexual activity: Not on file   Other Topics Concern    Not on file   Social History Narrative    Not on file     Social Drivers of Health     Financial Resource Strain: Not on file   Food Insecurity: No Food Insecurity (9/5/2024)    Received from [employer]    Hunger Vital Sign     Worried About Running Out of Food in the Last Year: Never true     Ran Out of Food in the Last Year: Never true   Transportation Needs: No Transportation Needs (9/5/2024)    Received from [employer]    PRAPARE - Transportation     Lack of Transportation (Medical): No     Lack of Transportation (Non-Medical): No   Physical Activity: Not on file   Stress: Not on file   Social Connections: Not on file   Interpersonal Safety: Not on file   Housing Stability: High Risk (9/5/2024)    Received from [employer]    Housing Stability Vital Sign     Unable to Pay for Housing in the Last Year: Yes     Number of Times Moved in the Last Year: 1     Homeless in the Last Year: No            Lab Results    Chemistry/lipid CBC Cardiac Enzymes/BNP/TSH/INR   Lab Results   Component Value Date    CHOL  "178 06/04/2010    HDL 67 (H) 06/04/2010    TRIG 77 06/04/2010    BUN 14.0 02/11/2025     02/11/2025    CO2 26 02/11/2025    Lab Results   Component Value Date    WBC 6.4 02/11/2025    HGB 13.8 02/11/2025    HCT 42.7 02/11/2025    MCV 91 02/11/2025     02/11/2025    No results found for: \"CKTOTAL\", \"CKMB\", \"TROPONINI\", \"BNP\", \"TSH\", \"INR\"       Cardiac Problems and Cardiac Diagnostics     Most Recent Cardiac testing:  ECG Personal interpretation 2/11/2025: Sinus rhythm with frequent PVCs    CTPE 2/11/25: Minimal calcified plaque in the proximal LAD.        Assessment/Recommendations   Assessment:    Chest pain: Atypical symptoms, prior workup unremarkable.  See #2 for workup    PVCs: Asymptomatic, significant PVC burden.  Prior echocardiogram at [facility] was unremarkable.  No sustained VT episodes were detected on Zio patch report.  Recommend cardiac MRI along with stress imaging given chronic troponin elevation.        TSH/free T4 ordered, repeat 2-week monitor to assess PVC burden on low-dose beta-blocker, may need to adjust dose further.    Troponin elevation:  MRI per # 3  HLD: lipid panel ordered  CAC: on statin, lipid panel ordered      The longitudinal plan of care for the diagnosis(es)/condition(s) as documented were addressed during this visit. Due to the added complexity in care, I will continue to support Yane in the subsequent management and with ongoing continuity of care.         Eduardo Foy MD, MPH  Non-invasive Cardiologist  [facility]       "

## 2025-04-01 LAB — CV ZIO PRELIM RESULTS: NORMAL

## 2025-04-07 ENCOUNTER — TRANSCRIBE ORDERS (OUTPATIENT)
Dept: OTHER | Age: 65
End: 2025-04-07

## 2025-04-07 ENCOUNTER — ANCILLARY ORDERS (OUTPATIENT)
Dept: GENERAL RADIOLOGY | Facility: HOSPITAL | Age: 65
End: 2025-04-07
Payer: COMMERCIAL

## 2025-04-07 DIAGNOSIS — R13.10 DYSPHAGIA, UNSPECIFIED TYPE: Primary | ICD-10-CM

## 2025-04-21 ENCOUNTER — TRANSFERRED RECORDS (OUTPATIENT)
Dept: HEALTH INFORMATION MANAGEMENT | Facility: CLINIC | Age: 65
End: 2025-04-21
Payer: COMMERCIAL

## 2025-05-05 ENCOUNTER — OFFICE VISIT (OUTPATIENT)
Dept: CARDIOLOGY | Facility: CLINIC | Age: 65
End: 2025-05-05
Payer: COMMERCIAL

## 2025-05-05 VITALS
BODY MASS INDEX: 32.28 KG/M2 | WEIGHT: 194 LBS | SYSTOLIC BLOOD PRESSURE: 124 MMHG | DIASTOLIC BLOOD PRESSURE: 79 MMHG | HEART RATE: 67 BPM | RESPIRATION RATE: 14 BRPM

## 2025-05-05 DIAGNOSIS — R07.2 PRECORDIAL PAIN: ICD-10-CM

## 2025-05-05 DIAGNOSIS — E78.00 PURE HYPERCHOLESTEROLEMIA: ICD-10-CM

## 2025-05-05 DIAGNOSIS — I49.3 PVC'S (PREMATURE VENTRICULAR CONTRACTIONS): Primary | ICD-10-CM

## 2025-05-05 DIAGNOSIS — I25.10 CORONARY ARTERY CALCIFICATION: ICD-10-CM

## 2025-05-05 DIAGNOSIS — I47.29 NSVT (NONSUSTAINED VENTRICULAR TACHYCARDIA) (H): ICD-10-CM

## 2025-05-05 PROCEDURE — 99214 OFFICE O/P EST MOD 30 MIN: CPT | Performed by: INTERNAL MEDICINE

## 2025-05-05 PROCEDURE — G2211 COMPLEX E/M VISIT ADD ON: HCPCS | Performed by: INTERNAL MEDICINE

## 2025-05-05 PROCEDURE — 3078F DIAST BP <80 MM HG: CPT | Performed by: INTERNAL MEDICINE

## 2025-05-05 PROCEDURE — 3074F SYST BP LT 130 MM HG: CPT | Performed by: INTERNAL MEDICINE

## 2025-05-05 NOTE — PROGRESS NOTES
[facility] HEART CARE   [address]  [place] [address]   [email or web address]   OFFICE: [phone]     CARDIOLOGY CLINIC NOTE     Assessment/Recommendations   Assessment:    PVCs; improved on low-dose beta-blocker.  Follow-up on cardiac MRI  NSVT: Follow-up on stress MRI   chest pain: Atypical symptoms, stress MRI was ordered for longstanding symptoms and mild troponin elevation.  CAC: Continue current dose statin  HLD, LDL excellent, continue current dose of Crestor       64 year old female w h/o PVCs, HTN, atypical CP (neg TM, nl echo - OLIVEIRA at [facility] last year). GERD, HLD.  Seen in the ER in Feb 2025 for pleuritic chest pain D-dimer was elevated therefore CT scan was done which was negative for PE, showed incidental pulmonary, also showed minimal CAC and elevating.  Troponin was minimally elevated at 15 (has been elevated in the past as well)     #CP-occurs at rest usually when lying down, no exertional symptoms; has been evaluated previously at [facility], negative treadmill stress test; no symptoms since ER visit; I recommended cardiac MRI along with stress imaging given chronic troponin elevation; this is not yet completed  Scheduled for later this month.    # PVCs: Asymptomatic.  19% burden on prior monitor; on low-dose beta-blocker.  Follow-up monitor shows 13% PVC burden.  Echo was normal.  She has history of hypothyroidism; I checked TFTs, TSH was elevated but free T4 was normal.  I advised to follow-up with PCP; they increased thyroid dose.     #HLD: With minimal to mild coronary calcification; on statin.  LDL 65.        Cardiac history:  ECG Personal interpretation 2/11/2025: Sinus rhythm with frequent PVCs     CTPE 2/11/25: Minimal calcified plaque in the proximal LAD.     Zio monitoring from 3/21/2025 to 3/29/2025 (duration 8d 5h)  Predominant rhythm was sinus rhythm, 49 to 136bpm, average 79bpm.  2 episode(s) of nonsustained supraventricular tachycardia - longest  8 beats, fastest 146bpm.  2 episode(s) of nonsustained ventricular tachycardia - longest 7 beats, fastest 169bpm.  No sustained tachyarrhythmias.  No atrial fibrillation.  There were no pauses of greater than 3 seconds.  Rare premature atrial contractions beats (isolated <1%).  Frequent premature ventricular contractions (isolated 13%).  These were predominantly unifocal.  Symptom triggers and diary entries (2) correlated to sinus rhythm, PVCs.        Medications  Allergies   Current Outpatient Medications   Medication Sig Dispense Refill    escitalopram (LEXAPRO) 20 MG tablet Take 20 mg by mouth daily      ferrous sulfate (FEROSUL) 325 (65 Fe) MG tablet Take 65 mg by mouth      gabapentin (NEURONTIN) 300 MG capsule 300 mg at bedtime for one week.  If ongoing pain but tolerating, go to 300 mg twice a day. After one more week, if ongoing pain but tolerating, go to one pill (300 mg) 3 times a day.      levothyroxine (SYNTHROID/LEVOTHROID) 200 MCG tablet Take 1 tablet by mouth daily      metoprolol succinate ER (TOPROL XL) 25 MG 24 hr tablet Take 1 tablet by mouth daily.      omeprazole (PRILOSEC) 20 MG DR capsule Take 20 mg by mouth      rosuvastatin (CRESTOR) 10 MG tablet Take 10 mg by mouth daily.      solifenacin (VESICARE) 5 MG tablet Take 5 mg by mouth daily.        No Known Allergies     Physical Examination Review of Systems   Vitals: /79 (BP Location: Left arm, Patient Position: Sitting, Cuff Size: Adult Large)   Pulse 67   Resp 14   Wt 88 kg (194 lb)   BMI 32.28 kg/m    BMI= Body mass index is 32.28 kg/m .  Wt Readings from Last 3 Encounters:   05/05/25 88 kg (194 lb)   03/13/25 88.5 kg (195 lb)   10/19/24 87.1 kg (192 lb)       General: pleasant female. No acute distress.   Neck: No JVD  Lungs: clear to auscultation  COR:  regular rate and rhythm, No murmurs, rubs, or gallops  Extrem: No edema   Per HPI          Lab Results    Chemistry/lipid CBC Cardiac Enzymes/BNP/TSH/INR   Lab Results    Component Value Date    CHOL 169 03/13/2025    HDL 48 (L) 03/13/2025    TRIG 279 (H) 03/13/2025    BUN 14.0 02/11/2025     02/11/2025    CO2 26 02/11/2025    Lab Results   Component Value Date    WBC 6.4 02/11/2025    HGB 13.8 02/11/2025    HCT 42.7 02/11/2025    MCV 91 02/11/2025     02/11/2025    Lab Results   Component Value Date    TSH 6.64 (H) 03/13/2025          The longitudinal plan of care for the diagnosis(es)/condition(s) as documented were addressed during this visit. Due to the added complexity in care, I will continue to support Yaen in the subsequent management and with ongoing continuity of care.          Eduardo Foy MD, MPH  Non-invasive Cardiologist  [facility]

## 2025-05-05 NOTE — LETTER
5/5/2025    Physician No Ref-Primary  No address on file    RE: Yane De Leon       Dear Colleague,     I had the pleasure of seeing Yane De Leon in the [facility].    [facility] HEART CARE [address]  [place] [address]   [email or web address]   OFFICE: [phone]     CARDIOLOGY CLINIC NOTE     Assessment/Recommendations   Assessment:    PVCs; improved on low-dose beta-blocker.  Follow-up on cardiac MRI  NSVT: Follow-up on stress MRI   chest pain: Atypical symptoms, stress MRI was ordered for longstanding symptoms and mild troponin elevation.  CAC: Continue current dose statin  HLD, LDL excellent, continue current dose of Crestor       64 year old female w h/o PVCs, HTN, atypical CP (neg TM, nl echo - OLIVEIRA at [facility] last year). GERD, HLD.  Seen in the ER in Feb 2025 for pleuritic chest pain D-dimer was elevated therefore CT scan was done which was negative for PE, showed incidental pulmonary, also showed minimal CAC and elevating.  Troponin was minimally elevated at 15 (has been elevated in the past as well)     #CP-occurs at rest usually when lying down, no exertional symptoms; has been evaluated previously at [facility], negative treadmill stress test; no symptoms since ER visit; I recommended cardiac MRI along with stress imaging given chronic troponin elevation; this is not yet completed  Scheduled for later this month.    # PVCs: Asymptomatic.  19% burden on prior monitor; on low-dose beta-blocker.  Follow-up monitor shows 13% PVC burden.  Echo was normal.  She has history of hypothyroidism; I checked TFTs, TSH was elevated but free T4 was normal.  I advised to follow-up with PCP; they increased thyroid dose.     #HLD: With minimal to mild coronary calcification; on statin.  LDL 65.        Cardiac history:  ECG Personal interpretation 2/11/2025: Sinus rhythm with frequent PVCs     CTPE 2/11/25: Minimal calcified plaque in the proximal LAD.      Zio monitoring from 3/21/2025 to 3/29/2025 (duration 8d 5h)  Predominant rhythm was sinus rhythm, 49 to 136bpm, average 79bpm.  2 episode(s) of nonsustained supraventricular tachycardia - longest 8 beats, fastest 146bpm.  2 episode(s) of nonsustained ventricular tachycardia - longest 7 beats, fastest 169bpm.  No sustained tachyarrhythmias.  No atrial fibrillation.  There were no pauses of greater than 3 seconds.  Rare premature atrial contractions beats (isolated <1%).  Frequent premature ventricular contractions (isolated 13%).  These were predominantly unifocal.  Symptom triggers and diary entries (2) correlated to sinus rhythm, PVCs.        Medications  Allergies   Current Outpatient Medications   Medication Sig Dispense Refill     escitalopram (LEXAPRO) 20 MG tablet Take 20 mg by mouth daily       ferrous sulfate (FEROSUL) 325 (65 Fe) MG tablet Take 65 mg by mouth       gabapentin (NEURONTIN) 300 MG capsule 300 mg at bedtime for one week.  If ongoing pain but tolerating, go to 300 mg twice a day. After one more week, if ongoing pain but tolerating, go to one pill (300 mg) 3 times a day.       levothyroxine (SYNTHROID/LEVOTHROID) 200 MCG tablet Take 1 tablet by mouth daily       metoprolol succinate ER (TOPROL XL) 25 MG 24 hr tablet Take 1 tablet by mouth daily.       omeprazole (PRILOSEC) 20 MG DR capsule Take 20 mg by mouth       rosuvastatin (CRESTOR) 10 MG tablet Take 10 mg by mouth daily.       solifenacin (VESICARE) 5 MG tablet Take 5 mg by mouth daily.        No Known Allergies     Physical Examination Review of Systems   Vitals: /79 (BP Location: Left arm, Patient Position: Sitting, Cuff Size: Adult Large)   Pulse 67   Resp 14   Wt 88 kg (194 lb)   BMI 32.28 kg/m    BMI= Body mass index is 32.28 kg/m .  Wt Readings from Last 3 Encounters:   05/05/25 88 kg (194 lb)   03/13/25 88.5 kg (195 lb)   10/19/24 87.1 kg (192 lb)       General: pleasant female. No acute distress.   Neck: No JVD  Lungs:  clear to auscultation  COR:  regular rate and rhythm, No murmurs, rubs, or gallops  Extrem: No edema   Per HPI          Lab Results    Chemistry/lipid CBC Cardiac Enzymes/BNP/TSH/INR   Lab Results   Component Value Date    CHOL 169 03/13/2025    HDL 48 (L) 03/13/2025    TRIG 279 (H) 03/13/2025    BUN 14.0 02/11/2025     02/11/2025    CO2 26 02/11/2025    Lab Results   Component Value Date    WBC 6.4 02/11/2025    HGB 13.8 02/11/2025    HCT 42.7 02/11/2025    MCV 91 02/11/2025     02/11/2025    Lab Results   Component Value Date    TSH 6.64 (H) 03/13/2025          The longitudinal plan of care for the diagnosis(es)/condition(s) as documented were addressed during this visit. Due to the added complexity in care, I will continue to support Yane in the subsequent management and with ongoing continuity of care.          Eduardo Foy MD, MPH  Non-invasive Cardiologist  [facility]         Thank you for allowing me to participate in the care of your patient.      Sincerely,     Eduardo BRAUN [facility]  cc:   Eduardo Foy  [address] [place] [address]

## 2025-05-12 ENCOUNTER — HOSPITAL ENCOUNTER (OUTPATIENT)
Dept: RADIOLOGY | Facility: HOSPITAL | Age: 65
Discharge: HOME | End: 2025-05-12
Attending: INTERNAL MEDICINE
Payer: COMMERCIAL

## 2025-05-12 ENCOUNTER — HOSPITAL ENCOUNTER (OUTPATIENT)
Dept: SPEECH THERAPY | Facility: HOSPITAL | Age: 65
Discharge: HOME | End: 2025-05-12
Attending: INTERNAL MEDICINE
Payer: COMMERCIAL

## 2025-05-12 DIAGNOSIS — R13.10 DYSPHAGIA, UNSPECIFIED TYPE: ICD-10-CM

## 2025-05-12 PROCEDURE — 92610 EVALUATE SWALLOWING FUNCTION: CPT | Mod: GN

## 2025-05-12 PROCEDURE — 74220 X-RAY XM ESOPHAGUS 1CNTRST: CPT

## 2025-05-12 PROCEDURE — 74230 X-RAY XM SWLNG FUNCJ C+: CPT

## 2025-05-12 PROCEDURE — 92611 MOTION FLUOROSCOPY/SWALLOW: CPT | Mod: GN

## 2025-05-12 NOTE — PROGRESS NOTES
SPEECH LANGUAGE PATHOLOGY EVALUATION    See electronic medical record for Abuse and Falls Screening details.    Subjective     Presenting condition or subjective complaint: sensation of food/liquid sticking in throat or chest, coughing after eating/drinking, history of esophageal dilations  Date of onset: 5/6/25    MD: Dr. Perry  Relevant medical history: Patient is a 64 year old female with history of GERD, PVC, and esophageal dysphagia with dilations. Most recent EGD is noted to be 10/7/2024 with report of inlet patch in upper esophagus-biopsied and dilated. Patient reports numerous dilations over the years with minimal relief in symptoms. She was referred for VFSS and esophagram by GI to assess for oropharyngeal dysphagia and possible cricopharyngeal hypertrophy or possible upper esophageal narrowing not otherwise viewed. Patient denies a history of pneumonia.     Objective     SWALLOW EVALUATION  Current Diet/Method of Nutritional Intake: thin liquids (level 0), regular diet      CLINICAL SWALLOW EVALUATION  Clinical swallow evaluation completed prior to VFSS via records review, clinical interview and oral motor examination.   Oral Motor Function: generally intact  Oral labial function: WFL  Lingual function: WFL  Buccal function: intact     Level of assist required for feeding: no assistance needed     VIDEOFLUOROSCOPIC SWALLOW STUDY  Radiologist: Dr. Li  Views Taken: left lateral   Physical location of procedure: [facility]     VFSS textures trialed:   VFSS Eval: Thin Liquids  Mode of Presentation: cup, straw, self-fed   Preparatory Phase: WFL  Oral Phase: WFL  Bolus Location When Swallow Initiated: valleculae, posterior laryngeal surface of epiglottis  Pharyngeal Phase: WFL  Rosenbeck's Penetration Aspiration Scale: 1 - no aspiration, contrast does not enter airway; inconsistent epiglottic undercoating noted- question true penetration rarely, clears immediately    VFSS Eval: Purees  Mode of  Presentation: spoon, self-fed   Preparatory Phase: WFL  Oral Phase: WFL  Bolus Location When Swallow Initiated: posterior angle of ramus  Pharyngeal Phase: WFL  Rosenbeck s Penetration Aspiration Scale: 1 - no aspiration, contrast does not enter airway    VFSS Eval: Solids  Mode of Presentation: self-fed   Preparatory Phase: WFL  Oral Phase: WFL  Bolus Location When Swallow Initiated: posterior angle of ramus  Pharyngeal Phase: WFL   Rosenbeck s Penetration Aspiration Scale: 1 - no aspiration, contrast does not enter airway    ESOPHAGEAL PHASE OF SWALLOW  See dedicated esophagram report by Radiologist    SWALLOW ASSESSMENT CLINICAL IMPRESSIONS AND RATIONALE  Diet Consistency Recommendations: thin liquids (level 0), regular diet    Recommended Feeding/Eating Techniques: standard safe eating and drinking strategies-upright for intake, avoid distractions or talking while eating/drinking; upright 30-60 minutes after meals, consult GI  Medication Administration Recommendations: as tolerated, patient preference    Assessment & Plan   CLINICAL IMPRESSIONS   Medical Diagnosis: Dysphagia    Treatment Diagnosis: Dysphagia   Impression/Assessment:   Video Swallow Study completed. Patient had no aspiration and minimal epiglottic undercoating/shallow penetration with consecutive sips of thin only. Oropharyngeal swallow function is WNL overall. Tongue base retraction is WNL. Pharyngeal constriction, hyolaryngeal elevation and excursion were all WNL. Epiglottic inversion is complete. Swallow response is timely. Mastication is safe and complete. Cricopharyngeal function is WFL with no hypertrophy and minimal, if any, luminal narrowing with no negative impact on bolus passage. Symptoms reported appear related to esophageal concerns at this time. Recommend Regular diet and Thin liquids.     PLAN OF CARE    Recommended Referrals to Other Professionals: GI  Education Assessment:   Learner/Method: Patient;Family;Listening;No Barriers to  Learning;Demonstration  Education Comments: VFSS protocol, results and recommendations. Educated patient on remaining upright at least 30-60 minutes following oral intake    Risks and benefits of evaluation/treatment have been explained.   Patient/Family/caregiver agrees with Plan of Care.     Evaluation Time:    SLP Eval: oral/pharyngeal swallow function, clinical minutes (53194): 8  SLP Eval: VideoFluoroscopic Swallow function Minutes (38341): 15      Signing Clinician: SELWYN Britton [facility]      Initial Assessment  See Epic Evaluation-